# Patient Record
Sex: FEMALE | Race: WHITE | NOT HISPANIC OR LATINO | Employment: FULL TIME | URBAN - METROPOLITAN AREA
[De-identification: names, ages, dates, MRNs, and addresses within clinical notes are randomized per-mention and may not be internally consistent; named-entity substitution may affect disease eponyms.]

---

## 2017-02-22 ENCOUNTER — TELEPHONE (OUTPATIENT)
Dept: FAMILY MEDICINE | Facility: CLINIC | Age: 37
End: 2017-02-22

## 2017-02-22 NOTE — TELEPHONE ENCOUNTER
Panel Management Review      Patient has the following on her problem list: None      Composite cancer screening  Chart review shows that this patient is due/due soon for the following Pap Smear  Summary:    Patient is due/failing the following:   PAP    Action needed:   Patient needs office visit for pappe.    Type of outreach:    no answer, no voice mail set up, please retry.    Questions for provider review:    None                                                                                                                                    Kati Cabello MA       Chart routed to Care Team .

## 2017-02-22 NOTE — LETTER
[facility]  [address]  [facility] [address]  Phone: [phone]    March 21, 2017      Gunnar Erwin  [address] [place] [address]      Dear Gunnar:    As part of [facility]'s commitment to health and wellness, we inform our patient when records indicate the need for specific health screening.  Please review the following health screening recommendations based on your age:                 Ages 20-40:  Annual physical that includes a breast exam, pelvic exam and possibly a pap smear and other lab work.    Please bring a list of your current medications with you to your appointment.  If you will need refills prior to your appointment, please have your pharmacy fax a refill request to the appropriate provider; this helps to ensure that the correct medication and dose are ordered.    To schedule an appointment with a [facility] physician or nurse practitioner, please call:    [facility]            [phone]    [facility]           [phone]    [facility] Diagnostic Department           Family Practice Clinic . . . . . . [phone]                  (To Schedule a Mammogram)           Internal Medicine Clinic. . . . .  [phone]             OB/Gyn Clinic . . . . . . . . . . .  [phone]           Specialty Clinic . . . . . . . . . .  [phone]    NOTE:  Please disregard this notice if you have already scheduled your health maintenance exam(s) or if you have been given  different instructions from your health care provider.

## 2017-04-10 ENCOUNTER — TELEPHONE (OUTPATIENT)
Dept: FAMILY MEDICINE | Facility: CLINIC | Age: 37
End: 2017-04-10

## 2017-04-10 NOTE — TELEPHONE ENCOUNTER
She needs office visit for further refills.  She's also due for routine health maintenance such as pap.  FELIBERTO Brown

## 2017-04-10 NOTE — TELEPHONE ENCOUNTER
Called pt, appt for 4/13/17 was made with provider in clinic.   Pt verbalized understanding and had no further questions at this time.   Encounter closed.   Ashley LAM RN

## 2017-04-10 NOTE — TELEPHONE ENCOUNTER
Epitol      Last Written Prescription Date:  Not on med list  Last Fill Quantity: 0,   # refills: 0  Last Office Visit with [facility] or [facility] prescribing provider: 07/19/2016  Future Office visit:       Routing refill request to provider for review/approval because:  Drug not active on patient's medication list    Oscar REVELES (R)

## 2017-04-13 ENCOUNTER — OFFICE VISIT (OUTPATIENT)
Dept: FAMILY MEDICINE | Facility: CLINIC | Age: 37
End: 2017-04-13

## 2017-04-13 ENCOUNTER — TELEPHONE (OUTPATIENT)
Dept: FAMILY MEDICINE | Facility: CLINIC | Age: 37
End: 2017-04-13

## 2017-04-13 VITALS
HEART RATE: 79 BPM | BODY MASS INDEX: 38.26 KG/M2 | WEIGHT: 265 LBS | TEMPERATURE: 98.1 F | SYSTOLIC BLOOD PRESSURE: 130 MMHG | OXYGEN SATURATION: 100 % | DIASTOLIC BLOOD PRESSURE: 85 MMHG

## 2017-04-13 DIAGNOSIS — Z11.51 SCREENING FOR HUMAN PAPILLOMAVIRUS: ICD-10-CM

## 2017-04-13 DIAGNOSIS — M67.40 GANGLION CYST: ICD-10-CM

## 2017-04-13 DIAGNOSIS — Z00.00 ROUTINE GENERAL MEDICAL EXAMINATION AT A HEALTH CARE FACILITY: Primary | ICD-10-CM

## 2017-04-13 DIAGNOSIS — Z12.4 SCREENING FOR MALIGNANT NEOPLASM OF CERVIX: ICD-10-CM

## 2017-04-13 DIAGNOSIS — N89.8 VAGINAL DISCHARGE: ICD-10-CM

## 2017-04-13 DIAGNOSIS — R56.9 CONVULSIONS, UNSPECIFIED CONVULSION TYPE (H): ICD-10-CM

## 2017-04-13 LAB
MICRO REPORT STATUS: ABNORMAL
SPECIMEN SOURCE: ABNORMAL
WET PREP SPEC: ABNORMAL

## 2017-04-13 PROCEDURE — 87210 SMEAR WET MOUNT SALINE/INK: CPT | Performed by: FAMILY MEDICINE

## 2017-04-13 PROCEDURE — 99395 PREV VISIT EST AGE 18-39: CPT | Performed by: FAMILY MEDICINE

## 2017-04-13 PROCEDURE — G0124 SCREEN C/V THIN LAYER BY MD: HCPCS | Performed by: FAMILY MEDICINE

## 2017-04-13 PROCEDURE — 87624 HPV HI-RISK TYP POOLED RSLT: CPT | Performed by: FAMILY MEDICINE

## 2017-04-13 PROCEDURE — G0145 SCR C/V CYTO,THINLAYER,RESCR: HCPCS | Performed by: FAMILY MEDICINE

## 2017-04-13 PROCEDURE — 36415 COLL VENOUS BLD VENIPUNCTURE: CPT | Performed by: FAMILY MEDICINE

## 2017-04-13 PROCEDURE — 80156 ASSAY CARBAMAZEPINE TOTAL: CPT | Performed by: FAMILY MEDICINE

## 2017-04-13 RX ORDER — CARBAMAZEPINE 200 MG/1
600 TABLET ORAL 2 TIMES DAILY
Qty: 540 TABLET | Refills: 3 | Status: SHIPPED | OUTPATIENT
Start: 2017-04-13 | End: 2017-04-21

## 2017-04-13 ASSESSMENT — ANXIETY QUESTIONNAIRES
3. WORRYING TOO MUCH ABOUT DIFFERENT THINGS: MORE THAN HALF THE DAYS
7. FEELING AFRAID AS IF SOMETHING AWFUL MIGHT HAPPEN: NOT AT ALL
GAD7 TOTAL SCORE: 9
5. BEING SO RESTLESS THAT IT IS HARD TO SIT STILL: SEVERAL DAYS
6. BECOMING EASILY ANNOYED OR IRRITABLE: NOT AT ALL
IF YOU CHECKED OFF ANY PROBLEMS ON THIS QUESTIONNAIRE, HOW DIFFICULT HAVE THESE PROBLEMS MADE IT FOR YOU TO DO YOUR WORK, TAKE CARE OF THINGS AT HOME, OR GET ALONG WITH OTHER PEOPLE: SOMEWHAT DIFFICULT
1. FEELING NERVOUS, ANXIOUS, OR ON EDGE: NEARLY EVERY DAY
2. NOT BEING ABLE TO STOP OR CONTROL WORRYING: MORE THAN HALF THE DAYS

## 2017-04-13 ASSESSMENT — PATIENT HEALTH QUESTIONNAIRE - PHQ9: 5. POOR APPETITE OR OVEREATING: SEVERAL DAYS

## 2017-04-13 ASSESSMENT — PAIN SCALES - GENERAL: PAINLEVEL: MILD PAIN (3)

## 2017-04-13 NOTE — MR AVS SNAPSHOT
After Visit Summary   4/13/2017    Gunnar Erwin    MRN: [id number]           Patient Information     Date Of Birth          1980        Visit Information        Provider Department      4/13/2017 2:20 PM Shaquille Benito MD [facility]        Today's Diagnoses     Routine general medical examination at a health care facility    -  1    Screening for malignant neoplasm of cervix        Screening for human papillomavirus        Vaginal discharge        Convulsions, unspecified convulsion type (H)        Ganglion cyst          Care Instructions      Preventive Health Recommendations  Female Ages 26 - 39  Yearly exam:   See your health care provider every year in order to    Review health changes.     Discuss preventive care.      Review your medicines if you your doctor has prescribed any.    Until age 30: Get a Pap test every three years (more often if you have had an abnormal result).    After age 30: Talk to your doctor about whether you should have a Pap test every 3 years or have a Pap test with HPV screening every 5 years.   You do not need a Pap test if your uterus was removed (hysterectomy) and you have not had cancer.  You should be tested each year for STDs (sexually transmitted diseases), if you're at risk.   Talk to your provider about how often to have your cholesterol checked.  If you are at risk for diabetes, you should have a diabetes test (fasting glucose).  Shots: Get a flu shot each year. Get a tetanus shot every 10 years.   Nutrition:     Eat at least 5 servings of fruits and vegetables each day.    Eat whole-grain bread, whole-wheat pasta and brown rice instead of white grains and rice.    Talk to your provider about Calcium and Vitamin D.     Lifestyle    Exercise at least 150 minutes a week (30 minutes a day, 5 days of the week). This will help you control your weight and prevent disease.    Limit alcohol to one drink per day.    No smoking.      Wear sunscreen to prevent skin cancer.    See your dentist every six months for an exam and cleaning.          Follow-ups after your visit        Additional Services     ORTHO  REFERRAL       [facility] is referring you to the Orthopedic  Services at [facility].       The  Representative will assist you in the coordination of your Orthopedic and Musculoskeletal Care as prescribed by your physician.    The  Representative will call you within 1 business day to help schedule your appointment, or you may contact the  Representative at:    All areas ~ [phone]     Type of Referral : Non Surgical       Timeframe requested: Routine    Coverage of these services is subject to the terms and limitations of your health insurance plan.  Please call member services at your health plan with any benefit or coverage questions.      If X-rays, CT or MRI's have been performed, please contact the facility where they were done to arrange for , prior to your scheduled appointment.  Please bring this referral request to your appointment and present it to your specialist.                  Who to contact     If you have questions or need follow up information about today's clinic visit or your schedule please contact [facility] directly at [phone].  Normal or non-critical lab and imaging results will be communicated to you by MyChart, letter or phone within 4 business days after the clinic has received the results. If you do not hear from us within 7 days, please contact the clinic through MyChart or phone. If you have a critical or abnormal lab result, we will notify you by phone as soon as possible.  Submit refill requests through [employer] or call your pharmacy and they will forward the refill request to us. Please allow 3 business days for your refill to be completed.          Additional Information About Your  "Visit        [facility] Information     [facility] lets you send messages to your doctor, view your test results, renew your prescriptions, schedule appointments and more. To sign up, go to [email or web address] . Click on \"Log in\" on the left side of the screen, which will take you to the Welcome page. Then click on \"Sign up Now\" on the right side of the page.     You will be asked to enter the access code listed below, as well as some personal information. Please follow the directions to create your username and password.     Your access code is: [email or web address]  Expires: 2017  3:54 PM     Your access code will  in 90 days. If you need help or a new code, please call your [place] clinic or [phone].        Care EveryWhere ID     This is your Care EveryWhere ID. This could be used by other organizations to access your [place] medical records  [id number]        Your Vitals Were     Pulse Temperature Last Period Pulse Oximetry Breastfeeding? BMI (Body Mass Index)    79 98.1  F (36.7  C) (Tympanic) 2017 (Exact Date) 100% No 38.26 kg/m2       Blood Pressure from Last 3 Encounters:   17 130/85   16 115/77   16 122/83    Weight from Last 3 Encounters:   17 265 lb (120.2 kg)   16 250 lb (113.4 kg)   05/13/15 (!) 330 lb (149.7 kg)              We Performed the Following     Carbamazepine total     HPV High Risk Types DNA Cervical     ORTHO  REFERRAL     Pap imaged thin layer screen with HPV - recommended age 30 - 65     Wet prep          Today's Medication Changes          These changes are accurate as of: 17  3:55 PM.  If you have any questions, ask your nurse or doctor.               These medicines have changed or have updated prescriptions.        Dose/Directions    carBAMazepine 200 MG tablet   Commonly known as:  TEGRETOL   This may have changed:  additional instructions   Used for:  Convulsions, unspecified convulsion type (H)   Changed by:  Thong, " Shaquille Velasquez MD        Dose:  600 mg   Take 3 tablets (600 mg) by mouth 2 times daily   Quantity:  540 tablet   Refills:  3            Where to get your medicines      These medications were sent to [id number] - [place], [place] - [address]  [address] [address]     Phone:  [phone]     carBAMazepine 200 MG tablet                Primary Care Provider Office Phone # Fax #    Chiquita Madsen [phone]       [facility] [address]  [facility] [address]        Thank you!     Thank you for choosing [facility]  for your care. Our goal is always to provide you with excellent care. Hearing back from our patients is one way we can continue to improve our services. Please take a few minutes to complete the written survey that you may receive in the mail after your visit with us. Thank you!             Your Updated Medication List - Protect others around you: Learn how to safely use, store and throw away your medicines at www.disposemymeds.org.          This list is accurate as of: 4/13/17  3:55 PM.  Always use your most recent med list.                   Brand Name Dispense Instructions for use    carBAMazepine 200 MG tablet    TEGRETOL    540 tablet    Take 3 tablets (600 mg) by mouth 2 times daily

## 2017-04-13 NOTE — LETTER
April 20, 2017    Gunnar Langley Yaima  [address] [facility] [address]      Dear ,      This letter is in regards to your recent cervical cancer screening (Pap smear and HPV test).    Your Pap smear result was reported as ASCUS or Atypical Squamous Cells of Undetermined Significance.. This means that there were mildly abnormal cells found in the sample that we collected from your cervix, but no cancer cells were found. The vast majority of patients with this result do not have significant cervical abnormalities.     Your cervical sample was also tested for the presence of Human Papillomavirus (HPV). Your HPV test is NEGATIVE for high risk HPV, meaning that no HPV was found at this time.     Over time, your body can get rid of these abnormal cells, so it is recommended that you repeat your pap and HPV in 3 years.    If you have questions about these results contact [phone]    Please continue to be seen every year for an annual physical exam and other preventative tests.         Sincerely,    Shaquille Benito MD/andreea

## 2017-04-13 NOTE — TELEPHONE ENCOUNTER
Epitol      Last Written Prescription Date:  Not on med list  Last Fill Quantity: 0,   # refills: 0  Last Office Visit with [facility], [facility] or [facility] prescribing provider: 07/19/2016  Future Office visit:    Next 5 appointments (look out 90 days)     Apr 13, 2017  2:20 PM CDT   SHORT with Shaquille Benito MD   [facility] ([facility]    [address] [facility] [address]   [phone]                   Routing refill request to provider for review/approval because:  Drug not active on patient's medication list    Oscar Kumar RT (R)

## 2017-04-13 NOTE — NURSING NOTE
"Chief Complaint   Patient presents with     Gyn Exam     Recheck Medication       Initial /85 (BP Location: Right arm, Patient Position: Chair, Cuff Size: Adult Large)  Pulse 79  Temp 98.1  F (36.7  C) (Tympanic)  Wt 265 lb (120.2 kg)  LMP 04/07/2017 (Exact Date)  SpO2 100%  Breastfeeding? No  BMI 38.26 kg/m2 Estimated body mass index is 38.26 kg/(m^2) as calculated from the following:    Height as of 7/19/16: 5' 9.78\" (1.772 m).    Weight as of this encounter: 265 lb (120.2 kg).  Medication Reconciliation: complete   Radha Dozier CMA       "

## 2017-04-13 NOTE — PROGRESS NOTES
SUBJECTIVE:     CC: Gunnar Erwin is an 36 year old woman who presents for preventive health visit.     Healthy Habits:    Do you get at least three servings of calcium containing foods daily (dairy, green leafy vegetables, etc.)? yes    Amount of exercise or daily activities, outside of work: 3 day(s) per week    Problems taking medications regularly No    Medication side effects: No    Have you had an eye exam in the past two years? yes    Do you see a dentist twice per year? no    Do you have sleep apnea, excessive snoring or daytime drowsiness?no        Has a history of menorrhagia. Usually has 1-2 days of very heavy bleeding.  Has noticed occasional vaginal odor and discharge.    Today's PHQ-2 Score:   PHQ-2 ( 1999 [employer]) 7/19/2016 3/21/2014   Q1: Little interest or pleasure in doing things 0 0   Q2: Feeling down, depressed or hopeless 0 0   PHQ-2 Score 0 0     Social History   Substance Use Topics     Smoking status: Current Every Day Smoker     Packs/day: 0.10     Years: 4.00     Types: Cigarettes     Start date: 4/22/2015     Smokeless tobacco: Never Used     Alcohol use Yes     The patient does not drink >3 drinks per day nor >7 drinks per week.    Recent Labs   Lab Test  08/31/12   0949  05/20/11   0947   CHOL  219*  192   HDL  45*  36*   LDL  148*  129   TRIG  131  137   CHOLHDLRATIO  5.0  5.0       Reviewed orders with patient.  Reviewed health maintenance and updated orders accordingly - Yes    Mammo Decision Support:  Mammogram not appropriate for this patient based on age.    Pertinent mammograms are reviewed under the imaging tab.  History of abnormal Pap smear:   NO - age 30-65 PAP every 5 years with negative HPV co-testing recommended  Last 3 Pap Results:   PAP (no units)   Date Value   04/13/2017 ASC-US (A)   06/22/2011 NIL   10/09/2008 NIL       Reviewed and updated as needed this visit by clinical staff  Tobacco  Allergies  Med Hx  Surg Hx  Fam Hx  Soc Hx        Reviewed and  updated as needed this visit by Provider        Past Medical History:   Diagnosis Date     ASCUS of cervix with negative high risk HPV 2017     Urinary tract infection, site not specified       Past Surgical History:   Procedure Laterality Date     C/SECTION, CLASSICAL  2006    , Classical     C/SECTION, CLASSICAL      , Classical     TONSILLECTOMY & ADENOIDECTOMY  1991     TUBAL LIGATION  2006       ROS:  Constitutional, neuro, ENT, endocrine, pulmonary, cardiac, gastrointestinal, genitourinary, musculoskeletal, integument and psychiatric systems are negative, except as otherwise noted.     Problem list, Medication list, Allergies, and Medical/Social/Surgical histories reviewed in [facility] and updated as appropriate.  Labs reviewed in EPIC  Patient Active Problem List   Diagnosis     Convulsions (H)     Generalized anxiety disorder     Obesity     Transient hypertension of pregnancy, antepartum     CARDIOVASCULAR SCREENING; LDL GOAL LESS THAN 160     Shoulder instability     Tobacco abuse     Self-injurious behavior     Health Care Home     ASCUS of cervix with negative high risk HPV     Past Surgical History:   Procedure Laterality Date     C/SECTION, CLASSICAL  2006    , Classical     C/SECTION, CLASSICAL      , Classical     TONSILLECTOMY & ADENOIDECTOMY  1991     TUBAL LIGATION  2006       Social History   Substance Use Topics     Smoking status: Current Every Day Smoker     Packs/day: 0.10     Years: 4.00     Types: Cigarettes     Start date: 2015     Smokeless tobacco: Never Used     Alcohol use Yes     Family History   Problem Relation Age of Onset     Hypertension Father      Depression Father      Alcohol/Drug Father      Hypertension Paternal Grandmother      Depression Paternal Grandmother      Alcohol/Drug Paternal Grandmother      Psychotic Disorder Paternal Grandmother      Hypertension Paternal  Grandfather      Depression Paternal Grandfather      CANCER Paternal Grandfather      cancer around his bile duct.     Alcohol/Drug Mother      Depression Mother      Alcohol/Drug Brother      Depression Brother      Psychotic Disorder Brother      Asthma No family hx of      C.A.D. No family hx of      DIABETES No family hx of      CEREBROVASCULAR DISEASE No family hx of      Breast Cancer No family hx of      Cancer - colorectal No family hx of      Prostate Cancer No family hx of          Current Outpatient Prescriptions   Medication Sig Dispense Refill     TEGRETOL 200 MG tablet Take 3 tablets (600 mg) by mouth 2 times daily 540 tablet 3     Allergies   Allergen Reactions     Nkda [No Known Drug Allergies]      OBJECTIVE:     There were no vitals taken for this visit.  EXAM:  GENERAL: healthy, alert and no distress  EYES: Eyes grossly normal to inspection, PERRL and conjunctivae and sclerae normal  HENT: ear canals and TM's normal, nose and mouth without ulcers or lesions  NECK: no adenopathy, no asymmetry, masses, or scars and thyroid normal to palpation  RESP: lungs clear to auscultation - no rales, rhonchi or wheezes  BREAST: normal without masses, tenderness or nipple discharge and no palpable axillary masses or adenopathy  CV: regular rate and rhythm, normal S1 S2, no S3 or S4, no murmur, click or rub, no peripheral edema and peripheral pulses strong  ABDOMEN: soft, nontender, no hepatosplenomegaly, no masses and bowel sounds normal   (female): normal female external genitalia, normal urethral meatus, vaginal mucosa pink, moist, well rugated, and normal cervix/adnexa/uterus without masses or discharge  MS: no gross musculoskeletal defects noted, no edema  SKIN: no suspicious lesions or rashes  NEURO: Normal strength and tone, mentation intact and speech normal  PSYCH: mentation appears normal, affect normal/bright    ASSESSMENT/PLAN:     1. Routine general medical examination at a [facility]  "facility    2. Convulsions, unspecified convulsion type (H)  Stable. Refilled medication.  Check labs.   - Carbamazepine total    3. Vaginal discharge  - Wet prep  - metroNIDAZOLE (METROGEL) 0.75 % vaginal gel; Place 1 applicator (5 g) vaginally At Bedtime for 5 days  Dispense: 70 g; Refill: 0    4. Ganglion cyst  - ORTHO  REFERRAL    5. Screening for malignant neoplasm of cervix  - Pap imaged thin layer screen with HPV - recommended age 30 - 65    6. Screening for human papillomavirus  - HPV High Risk Types DNA Cervical    COUNSELING:   Reviewed preventive health counseling, as reflected in patient instructions    BP Screening:   Last 3 BP Readings:    BP Readings from Last 3 Encounters:   04/13/17 130/85   07/19/16 115/77   07/02/16 122/83       The following was recommended to the patient:  Re-screen BP within a year and recommended lifestyle modifications     reports that she has been smoking Cigarettes.  She started smoking about 1 years ago. She has a 0.40 pack-year smoking history. She has never used smokeless tobacco.  Tobacco Cessation Action Plan: Information offered: Patient not interested at this time  Estimated body mass index is 36.09 kg/(m^2) as calculated from the following:    Height as of 7/19/16: 5' 9.78\" (1.772 m).    Weight as of 7/19/16: 250 lb (113.4 kg).   Weight management plan: Discussed healthy diet and exercise guidelines and patient will follow up in 12 months in clinic to re-evaluate.    Counseling Resources:  ATP IV Guidelines  Pooled Cohorts Equation Calculator  Breast Cancer Risk Calculator  FRAX Risk Assessment  ICSI Preventive Guidelines  Dietary Guidelines for Americans, 2010  USDA's MyPlate  ASA Prophylaxis  Lung CA Screening    Shaquille Benito MD  [facility]  "

## 2017-04-13 NOTE — TELEPHONE ENCOUNTER
This was already denied by [facility] Zarago[facility].  See refill request from 4/10/2017 - we may need to notify pharmacy so they quit sending the refill request.      Has apt with Dr. MILADYS Benito this afternoon.    Chiquita Madsen M.D.

## 2017-04-14 LAB — CARBAMAZEPINE SERPL-MCNC: 2.3 MG/L (ref 4–12)

## 2017-04-14 RX ORDER — METRONIDAZOLE 7.5 MG/G
1 GEL VAGINAL AT BEDTIME
Qty: 70 G | Refills: 0 | Status: SHIPPED | OUTPATIENT
Start: 2017-04-14 | End: 2017-04-19

## 2017-04-14 ASSESSMENT — ANXIETY QUESTIONNAIRES: GAD7 TOTAL SCORE: 9

## 2017-04-14 ASSESSMENT — PATIENT HEALTH QUESTIONNAIRE - PHQ9: SUM OF ALL RESPONSES TO PHQ QUESTIONS 1-9: 3

## 2017-04-18 LAB
COPATH REPORT: ABNORMAL
PAP: ABNORMAL

## 2017-04-20 LAB
FINAL DIAGNOSIS: NORMAL
HPV HR 12 DNA CVX QL NAA+PROBE: NEGATIVE
HPV16 DNA SPEC QL NAA+PROBE: NEGATIVE
HPV18 DNA SPEC QL NAA+PROBE: NEGATIVE
SPECIMEN DESCRIPTION: NORMAL

## 2017-04-21 DIAGNOSIS — R56.9 CONVULSIONS, UNSPECIFIED CONVULSION TYPE (H): ICD-10-CM

## 2017-04-21 RX ORDER — CARBAMAZEPINE 200 MG
600 TABLET ORAL 2 TIMES DAILY
Qty: 540 TABLET | Refills: 3 | Status: SHIPPED | OUTPATIENT
Start: 2017-04-21 | End: 2018-04-26

## 2017-04-21 RX ORDER — CARBAMAZEPINE 200 MG/1
600 TABLET ORAL 2 TIMES DAILY
Qty: 540 TABLET | Refills: 3 | Status: CANCELLED | OUTPATIENT
Start: 2017-04-21

## 2018-04-26 DIAGNOSIS — R56.9 CONVULSIONS, UNSPECIFIED CONVULSION TYPE (H): ICD-10-CM

## 2018-04-27 NOTE — TELEPHONE ENCOUNTER
"Requested Prescriptions   Pending Prescriptions Disp Refills     TEGRETOL 200 MG tablet [Pharmacy Med Name: EPITOL 200MG TAB]  Last Written Prescription Date:  04/21/2017  Last Fill Quantity: 540,  # refills: 3   Last office visit: 4/13/2017 with prescribing provider:  Cale   Future Office Visit:     84 tablet 25     Sig: TAKE THREE TABLETS BY MOUTH TWICE DAILY    Anti-Seizure Meds Protocol  Failed    4/26/2018  6:34 PM       Failed - Recent (12 mo) or future (30 days) visit within the authorizing provider's specialty    Patient had office visit in the last 12 months or has a visit in the next 30 days with authorizing provider or within the authorizing provider's specialty.  See \"Patient Info\" tab in inbasket, or \"Choose Columns\" in Meds & Orders section of the refill encounter.           Failed - Review Authorizing provider's last note.     Refer to last progress notes: confirm request is for original authorizing provider (cannot be through other providers).         Failed - Normal CBC on file in past 26 months    Recent Labs   Lab Test  05/13/15   1710   WBC  7.0   RBC  4.14   HGB  13.6   HCT  40.0   PLT  271            Failed - Normal ALT or AST on file in past 26 months    Recent Labs   Lab Test  05/13/15   1710   ALT  28     Recent Labs   Lab Test  05/13/15   1710   AST  20            Failed - Normal platelet count on file in past 26 months    Recent Labs   Lab Test  05/13/15   1710   PLT  271              Passed - Carbamazepine level within therapeutic range in last 26 months    No lab results found.    Carbamazepine level must be checked 2-4 weeks after dosage change.           Passed - No active pregnancy on record       Passed - No positive pregnancy test in last 12 months        Oscar Kumar RT (R)    "

## 2018-05-01 RX ORDER — CARBAMAZEPINE 200 MG
TABLET ORAL
Qty: 6 TABLET | Refills: 0 | Status: SHIPPED | OUTPATIENT
Start: 2018-05-01 | End: 2018-05-02

## 2018-05-01 NOTE — TELEPHONE ENCOUNTER
Patient notified she is due for for OV with her provider  Patient verbalized understanding and reports she is out of medication  Scheduled appt 5/2/18  Patient asked for clinic to call pharmacy to get one day of medication to pharmacy today to get her through until she sees provider tomorrow  Sent 6 tablets to pharmacy today    Leida KU Rn

## 2018-05-02 ENCOUNTER — OFFICE VISIT (OUTPATIENT)
Dept: FAMILY MEDICINE | Facility: CLINIC | Age: 38
End: 2018-05-02

## 2018-05-02 VITALS
HEIGHT: 69 IN | RESPIRATION RATE: 16 BRPM | BODY MASS INDEX: 43.1 KG/M2 | DIASTOLIC BLOOD PRESSURE: 87 MMHG | HEART RATE: 78 BPM | TEMPERATURE: 98.8 F | WEIGHT: 291 LBS | SYSTOLIC BLOOD PRESSURE: 129 MMHG

## 2018-05-02 DIAGNOSIS — Z00.00 ROUTINE GENERAL MEDICAL EXAMINATION AT A HEALTH CARE FACILITY: Primary | ICD-10-CM

## 2018-05-02 DIAGNOSIS — Z23 NEED FOR VACCINATION: ICD-10-CM

## 2018-05-02 DIAGNOSIS — R56.9 CONVULSIONS, UNSPECIFIED CONVULSION TYPE (H): ICD-10-CM

## 2018-05-02 DIAGNOSIS — Z13.220 SCREENING FOR LIPOID DISORDERS: ICD-10-CM

## 2018-05-02 LAB
CARBAMAZEPINE SERPL-MCNC: 9.6 MG/L (ref 4–12)
CHOLEST SERPL-MCNC: 214 MG/DL
HDLC SERPL-MCNC: 52 MG/DL
LDLC SERPL CALC-MCNC: 140 MG/DL
NONHDLC SERPL-MCNC: 162 MG/DL
TRIGL SERPL-MCNC: 110 MG/DL

## 2018-05-02 PROCEDURE — 90714 TD VACC NO PRESV 7 YRS+ IM: CPT | Performed by: NURSE PRACTITIONER

## 2018-05-02 PROCEDURE — 80156 ASSAY CARBAMAZEPINE TOTAL: CPT | Performed by: NURSE PRACTITIONER

## 2018-05-02 PROCEDURE — 80061 LIPID PANEL: CPT | Performed by: NURSE PRACTITIONER

## 2018-05-02 PROCEDURE — 99395 PREV VISIT EST AGE 18-39: CPT | Mod: 25 | Performed by: NURSE PRACTITIONER

## 2018-05-02 PROCEDURE — 90471 IMMUNIZATION ADMIN: CPT | Performed by: NURSE PRACTITIONER

## 2018-05-02 PROCEDURE — 36415 COLL VENOUS BLD VENIPUNCTURE: CPT | Performed by: NURSE PRACTITIONER

## 2018-05-02 RX ORDER — CARBAMAZEPINE 200 MG/1
TABLET ORAL
Qty: 84 TABLET | Refills: 11 | Status: SHIPPED | OUTPATIENT
Start: 2018-05-02 | End: 2018-10-15

## 2018-05-02 NOTE — NURSING NOTE
"Chief Complaint   Patient presents with     Physical       Initial /87 (BP Location: Right arm, Patient Position: Chair, Cuff Size: Adult Large)  Pulse 78  Temp 98.8  F (37.1  C) (Oral)  Resp 16  Ht 5' 9\" (1.753 m)  Wt 291 lb (132 kg)  LMP 04/20/2018 (Approximate)  BMI 42.97 kg/m2 Estimated body mass index is 42.97 kg/(m^2) as calculated from the following:    Height as of this encounter: 5' 9\" (1.753 m).    Weight as of this encounter: 291 lb (132 kg).  Medication Reconciliation: complete     Prior to injection verified patient identity using patient's name and date of birth.    Screening Questionnaire for Adult Immunization    Are you sick today?   No   Do you have allergies to medications, food, a vaccine component or latex?   No   Have you ever had a serious reaction after receiving a vaccination?   No   Do you have a long-term health problem with heart disease, lung disease, asthma, kidney disease, metabolic disease (e.g. diabetes), anemia, or other blood disorder?   No   Do you have cancer, leukemia, HIV/AIDS, or any other immune system problem?   No   In the past 3 months, have you taken medications that affect  your immune system, such as prednisone, other steroids, or anticancer drugs; drugs for the treatment of rheumatoid arthritis, Crohn s disease, or psoriasis; or have you had radiation treatments?   No   Have you had a seizure, or a brain or other nervous system problem?   No   During the past year, have you received a transfusion of blood or blood     products, or been given immune (gamma) globulin or antiviral drug?   No   For women: Are you pregnant or is there a chance you could become        pregnant during the next month?   No   Have you received any vaccinations in the past 4 weeks?   No     Immunization questionnaire answers were all negative.        Per orders of Naomy Zaragoza, injection of Td given by Kati Cabello. Patient instructed to remain in clinic for 15 minutes " afterwards, and to report any adverse reaction to me immediately.       Screening performed by Kati Cabello on 5/2/2018 at 8:58 AM.
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
## 2018-05-02 NOTE — MR AVS SNAPSHOT
After Visit Summary   5/2/2018    Gunnar Erwin    MRN: [id number]           Patient Information     Date Of Birth          1980        Visit Information        Provider Department      5/2/2018 8:00 AM Naomy Zaragoza APRN [facility]        Today's Diagnoses     Routine general medical examination at a health care facility    -  1    Convulsions, unspecified convulsion type (H)        Screening for lipoid disorders          Care Instructions      Preventive Health Recommendations  Female Ages 26 - 39  Yearly exam:   See your health care provider every year in order to    Review health changes.     Discuss preventive care.      Review your medicines if you your doctor has prescribed any.    Until age 30: Get a Pap test every three years (more often if you have had an abnormal result).    After age 30: Talk to your doctor about whether you should have a Pap test every 3 years or have a Pap test with HPV screening every 5 years.   You do not need a Pap test if your uterus was removed (hysterectomy) and you have not had cancer.  You should be tested each year for STDs (sexually transmitted diseases), if you're at risk.   Talk to your provider about how often to have your cholesterol checked.  If you are at risk for diabetes, you should have a diabetes test (fasting glucose).  Shots: Get a flu shot each year. Get a tetanus shot every 10 years.   Nutrition:     Eat at least 5 servings of fruits and vegetables each day.    Eat whole-grain bread, whole-wheat pasta and brown rice instead of white grains and rice.    Talk to your provider about Calcium and Vitamin D.     Lifestyle    Exercise at least 150 minutes a week (30 minutes a day, 5 days of the week). This will help you control your weight and prevent disease.    Limit alcohol to one drink per day.    No smoking.     Wear sunscreen to prevent skin cancer.    See your dentist every six months for an exam and  "cleaning.            Follow-ups after your visit        Who to contact     If you have questions or need follow up information about today's clinic visit or your schedule please contact [facility] directly at [phone].  Normal or non-critical lab and imaging results will be communicated to you by MyChart, letter or phone within 4 business days after the clinic has received the results. If you do not hear from us within 7 days, please contact the clinic through MyChart or phone. If you have a critical or abnormal lab result, we will notify you by phone as soon as possible.  Submit refill requests through [employer] or call your pharmacy and they will forward the refill request to us. Please allow 3 business days for your refill to be completed.          Additional Information About Your Visit        [employer] Information     [employer] lets you send messages to your doctor, view your test results, renew your prescriptions, schedule appointments and more. To sign up, go to [email or web address] . Click on \"Log in\" on the left side of the screen, which will take you to the Welcome page. Then click on \"Sign up Now\" on the right side of the page.     You will be asked to enter the access code listed below, as well as some personal information. Please follow the directions to create your username and password.     Your access code is: [email or web address]  Expires: 2018  8:49 AM     Your access code will  in 90 days. If you need help or a new code, please call your [place] clinic or [phone].        Care EveryWhere ID     This is your Care EveryWhere ID. This could be used by other organizations to access your [place] medical records  [id number]        Your Vitals Were     Pulse Temperature Respirations Height Last Period BMI (Body Mass Index)    78 98.8  F (37.1  C) (Oral) 16 5' 9\" (1.753 m) 2018 (Approximate) 42.97 kg/m2       Blood Pressure from Last 3 Encounters:   18 129/87 "   04/13/17 130/85   07/19/16 115/77    Weight from Last 3 Encounters:   05/02/18 291 lb (132 kg)   04/13/17 265 lb (120.2 kg)   07/19/16 250 lb (113.4 kg)              We Performed the Following     Carbamazepine total     Lipid Profile (Chol, Trig, HDL, LDL calc)          Today's Medication Changes          These changes are accurate as of 5/2/18  8:49 AM.  If you have any questions, ask your nurse or doctor.               These medicines have changed or have updated prescriptions.        Dose/Directions    carBAMazepine 200 MG tablet   Commonly known as:  TEGRETOL   This may have changed:  See the new instructions.   Used for:  Convulsions, unspecified convulsion type (H)   Changed by:  Naomy Zaragoza APRN CNP        Take 3 tablets by mouth twice daily.   Quantity:  84 tablet   Refills:  11            Where to get your medicines      These medications were sent to [facility] [address] [place] [address] [address]  [address], [facility] [address]     Phone:  [phone]     carBAMazepine 200 MG tablet                Primary Care Provider Office Phone # Fax #    Chiquita Madsen [phone] [address] [facility] [address]        Equal Access to Services     BERNA CHUA AH: Hadii linda mcleod hadasho Soviridianaali, waaxda luqadaha, qaybta kaalmada adeegyada, aidee obando. So [facility] [phone].    ATENCIÓN: Si habla español, tiene a saenz disposición servicios gratuitos de asistencia lingüística. Llame al [phone].    We comply with applicable federal civil rights laws and [place] laws. We do not discriminate on the basis of race, color, national origin, age, disability, sex, sexual orientation, or gender identity.            Thank you!     Thank you for choosing [facility]  for your care. Our goal is always to provide you with excellent care. Hearing back from our patients is one way we can continue to improve  our services. Please take a few minutes to complete the written survey that you may receive in the mail after your visit with us. Thank you!             Your Updated Medication List - Protect others around you: Learn how to safely use, store and throw away your medicines at www.disposemymeds.org.          This list is accurate as of 5/2/18  8:49 AM.  Always use your most recent med list.                   Brand Name Dispense Instructions for use Diagnosis    carBAMazepine 200 MG tablet    TEGRETOL    84 tablet    Take 3 tablets by mouth twice daily.    Convulsions, unspecified convulsion type (H)

## 2018-06-28 ENCOUNTER — OFFICE VISIT (OUTPATIENT)
Dept: FAMILY MEDICINE | Facility: CLINIC | Age: 38
End: 2018-06-28

## 2018-06-28 ENCOUNTER — RADIANT APPOINTMENT (OUTPATIENT)
Dept: GENERAL RADIOLOGY | Facility: CLINIC | Age: 38
End: 2018-06-28
Attending: NURSE PRACTITIONER

## 2018-06-28 VITALS
HEIGHT: 69 IN | TEMPERATURE: 99.5 F | HEART RATE: 65 BPM | RESPIRATION RATE: 16 BRPM | WEIGHT: 290 LBS | DIASTOLIC BLOOD PRESSURE: 74 MMHG | BODY MASS INDEX: 42.95 KG/M2 | SYSTOLIC BLOOD PRESSURE: 116 MMHG

## 2018-06-28 DIAGNOSIS — M25.562 ACUTE PAIN OF LEFT KNEE: ICD-10-CM

## 2018-06-28 DIAGNOSIS — M25.562 ACUTE PAIN OF LEFT KNEE: Primary | ICD-10-CM

## 2018-06-28 DIAGNOSIS — E66.01 MORBID OBESITY (H): ICD-10-CM

## 2018-06-28 PROCEDURE — 99213 OFFICE O/P EST LOW 20 MIN: CPT | Performed by: NURSE PRACTITIONER

## 2018-06-28 PROCEDURE — 73562 X-RAY EXAM OF KNEE 3: CPT | Mod: LT

## 2018-06-28 ASSESSMENT — PAIN SCALES - GENERAL: PAINLEVEL: WORST PAIN (10)

## 2018-06-28 NOTE — MR AVS SNAPSHOT
After Visit Summary   6/28/2018    Gunnar Erwin    MRN: [id number]           Patient Information     Date Of Birth          1980        Visit Information        Provider Department      6/28/2018 7:40 AM Naomy Zaragoza APRN [facility]        Today's Diagnoses     Acute pain of left knee    -  1    Morbid obesity (H)          Care Instructions    Will be notified of pending x ray results.  Start PT.  Ice and Naproxen twice daily with food for the next 3 days.  Follow up if symptoms persist or worsen and as needed.        Thank you for choosing [facility].  You may be receiving a survey in the mail from [employer] regarding your visit today.  Please take a few minutes to complete and return the survey to let us know how we are doing.      Our Clinic hours are:  Mondays    7:20 am - 7 pm  Tues - Fri  7:20 am - 5 pm    Clinic Phone: [phone]    The clinic lab opens at 7:30 am Mon - Fri and appointments are required.    [facility]. [phone]  Monday  8 am - 7pm  Tues - Fri 8 am - 5:30 pm                 Follow-ups after your visit        Additional Services     PHYSICAL THERAPY REFERRAL       *This therapy referral will be filtered to a centralized scheduling office at [facility] and the patient will receive a call to schedule an appointment at a [facility] location most convenient for them. *     [facility] provides Physical Therapy evaluation and treatment and many specialty services across the [facility] system.  If requesting a specialty program, please choose from the list below.    If you have not heard from the scheduling office within 2 business days, please call [phone] for all locations, with the exception of [place], please call [phone] and [facility], please call [phone]  Treatment: Evaluation & Treatment  Special Instructions/Modalities:   Special Programs:  "    Please be aware that coverage of these services is subject to the terms and limitations of your health insurance plan.  Call member services at your health plan with any benefit or coverage questions.      **Note to Provider:  If you are referring outside of [place] for the therapy appointment, please list the name of the location in the \"special instructions\" above, print the referral and give to the patient to schedule the appointment.                  Follow-up notes from your care team     Return if symptoms worsen or fail to improve.      Future tests that were ordered for you today     Open Future Orders        Priority Expected Expires Ordered    XR Knee Left 3 Views Routine 6/28/2018 6/28/2019 6/28/2018            Who to contact     If you have questions or need follow up information about today's clinic visit or your schedule please contact [facility] directly at [phone].  Normal or non-critical lab and imaging results will be communicated to you by [employer], letter or phone within 4 business days after the clinic has received the results. If you do not hear from us within 7 days, please contact the clinic through [employer] or phone. If you have a critical or abnormal lab result, we will notify you by phone as soon as possible.  Submit refill requests through [employer] or call your pharmacy and they will forward the refill request to us. Please allow 3 business days for your refill to be completed.          Additional Information About Your Visit        [employer] Information     [employer] gives you secure access to your electronic health record. If you see a primary care provider, you can also send messages to your care team and make appointments. If you have questions, please call your primary care clinic.  If you do not have a primary care provider, please call [phone] and they will assist you.        Care EveryWhere ID     This is your Care EveryWhere ID. This could be used by " "other organizations to access your [place] medical records  [id number]        Your Vitals Were     Pulse Temperature Respirations Height BMI (Body Mass Index)       65 99.5  F (37.5  C) (Oral) 16 5' 9\" (1.753 m) 42.83 kg/m2        Blood Pressure from Last 3 Encounters:   06/28/18 116/74   05/02/18 129/87   04/13/17 130/85    Weight from Last 3 Encounters:   06/28/18 290 lb (131.5 kg)   05/02/18 291 lb (132 kg)   04/13/17 265 lb (120.2 kg)              We Performed the Following     PHYSICAL THERAPY REFERRAL        Primary Care Provider Office Phone # Fax #    Chiquita Madsen [phone] [address] [facility] [address]        Equal Access to Services     [facility] HospitalAL[facility] : Hadii aad ku hadasho Soelsie, waaxda luqadaha, qaybta kaalmada aderavindrayacaitlyn, aidee boyd . So [facility] [phone].    ATENCIÓN: Si habla español, tiene a saenz disposición servicios gratuitos de asistencia lingüística. iVdal al [phone].    We comply with applicable federal civil rights laws and [place] laws. We do not discriminate on the basis of race, color, national origin, age, disability, sex, sexual orientation, or gender identity.            Thank you!     Thank you for choosing [facility]  for your care. Our goal is always to provide you with excellent care. Hearing back from our patients is one way we can continue to improve our services. Please take a few minutes to complete the written survey that you may receive in the mail after your visit with us. Thank you!             Your Updated Medication List - Protect others around you: Learn how to safely use, store and throw away your medicines at www.disposemymeds.org.          This list is accurate as of 6/28/18  8:38 AM.  Always use your most recent med list.                   Brand Name Dispense Instructions for use Diagnosis    carBAMazepine 200 MG tablet    TEGRETOL    84 tablet    Take 3 tablets by mouth " twice daily.    Convulsions, unspecified convulsion type (H)

## 2018-06-28 NOTE — PROGRESS NOTES
"  SUBJECTIVE:   Gunnar Erwin is a 38 year old female who presents to clinic today for the following health issues:      Musculoskeletal problem/pain      Duration: years, 6 weeks ago she was kneeling sh e heard a pop and has been a 10/10 pain ever since.    Description  Location: left knee    Intensity:  severe    Accompanying signs and symptoms: radiation of pain to up and down the leg, weakness of knee, warmth and swelling    History  Previous similar problem: YES  Previous evaluation:  x-ray-years ago    Precipitating or alleviating factors:  Trauma or overuse: YES- was kneeling 6 weeks ago and heard a pop.  Aggravating factors include: sitting and laying    Therapies tried and outcome: ice, NSAID - not helping and KT tape, she has had to take a sleep aid to sleep at night.          Problem list and histories reviewed & adjusted, as indicated.  Additional history: states \"years ago\" she hurt her left knee. She doesn't recall much for specifics but does think they told her it was meniscus. She never had surgery and doesn't recall doing PT.  She states left knee has been hurting again for about six weeks. She was kneeling and felt a loud pop which started the pain. Pain is worse at night. She works as a cook and stands a lot. Denies any swelling or bruising of knee.  She has tried Ibuprofen, ice and KT tape which does help a little.  No other joint pain.   Feels well otherwise.  She does a lot of holistic treatments to help with inflammation like tumeric and states arthritis does run in her family.  Struggles with her weight, likes to hike but hasn't been able to with knee pain.    Patient Active Problem List   Diagnosis     Convulsions (H)     Generalized anxiety disorder     Obesity     Transient hypertension of pregnancy, antepartum     CARDIOVASCULAR SCREENING; LDL GOAL LESS THAN 160     Shoulder instability     Tobacco abuse     Self-injurious behavior     Health Care Home     ASCUS of cervix with " negative high risk HPV     Morbid obesity (H)     Past Surgical History:   Procedure Laterality Date     C/SECTION, CLASSICAL  2006    , Classical     C/SECTION, CLASSICAL      , Classical     TONSILLECTOMY & ADENOIDECTOMY  1991     TUBAL LIGATION  2006       Social History   Substance Use Topics     Smoking status: Former Smoker     Packs/day: 0.10     Years: 7.00     Types: Cigarettes     Start date: 2015     Smokeless tobacco: Never Used      Comment: 3-4 ciggs per day     Alcohol use Yes      Comment: occ     Family History   Problem Relation Age of Onset     Hypertension Father      Depression Father      Alcohol/Drug Father      Hypertension Paternal Grandmother      Depression Paternal Grandmother      Alcohol/Drug Paternal Grandmother      Psychotic Disorder Paternal Grandmother      Hypertension Paternal Grandfather      Depression Paternal Grandfather      Cancer Paternal Grandfather      cancer around his bile duct.     Alcohol/Drug Mother      Depression Mother      Alcohol/Drug Brother      Depression Brother      Psychotic Disorder Brother      Asthma No family hx of      C.A.D. No family hx of      Diabetes No family hx of      Cerebrovascular Disease No family hx of      Breast Cancer No family hx of      Cancer - colorectal No family hx of      Prostate Cancer No family hx of          Current Outpatient Prescriptions   Medication Sig Dispense Refill     carBAMazepine (TEGRETOL) 200 MG tablet Take 3 tablets by mouth twice daily. 84 tablet 11     Allergies   Allergen Reactions     Nkda [No Known Drug Allergies]      BP Readings from Last 3 Encounters:   18 116/74   18 129/87   17 130/85    Wt Readings from Last 3 Encounters:   18 290 lb (131.5 kg)   18 291 lb (132 kg)   17 265 lb (120.2 kg)                    Reviewed and updated as needed this visit by clinical staff  Tobacco  Allergies  Meds  Med Hx  Surg Hx   "Fam Hx  Soc Hx      Reviewed and updated as needed this visit by Provider          ROS: 10 point ROS neg other than the symptoms noted above in the HPI.    OBJECTIVE:     /74 (BP Location: Right arm, Patient Position: Chair, Cuff Size: Adult Large)  Pulse 65  Temp 99.5  F (37.5  C) (Oral)  Resp 16  Ht 5' 9\" (1.753 m)  Wt 290 lb (131.5 kg)  BMI 42.83 kg/m2  Body mass index is 42.83 kg/(m^2).  GENERAL: healthy, alert and no distress  NECK: no adenopathy, no asymmetry  RESP: lungs clear to auscultation - no rales, rhonchi or wheezes  CV: regular rate and rhythm, normal S1 S2, no S3 or S4, no murmur  ABDOMEN: soft, obese  MS: no gross musculoskeletal defects noted, able to walk without limp, good ability to do squat, FROM of knee without limitations, clicking or instability, pain is present with palpation over anterior knee and joint line      Diagnostic Test Results:  No results found for this or any previous visit (from the past 24 hour(s)).    ASSESSMENT/PLAN:             1. Acute pain of left knee    - XR Knee Left 3 Views; Future  - PHYSICAL THERAPY REFERRAL  Will start with x ray and PT. If needed, proceed with MRI and orthopedics consult. Reviewed need to lose weight.     2. Morbid obesity (H)        See Patient Instructions  Patient Instructions   Will be notified of pending x ray results.  Start PT.  Ice and Naproxen twice daily with food for the next 3 days.  Follow up if symptoms persist or worsen and as needed.        Thank you for choosing [facility].  You may be receiving a survey in the mail from [employer] regarding your visit today.  Please take a few minutes to complete and return the survey to let us know how we are doing.      Our Clinic hours are:  Mondays    7:20 am - 7 pm  Tues -  Fri  7:20 am - 5 pm    Clinic Phone: [phone]    The clinic lab opens at 7:30 am Mon - Fri and appointments are required.    [facility]. [phone]  Monday  8 am - " 7pm  Tues - Fri 8 am - 5:30 pm             DIONNA Nelson [facility]

## 2018-09-07 ENCOUNTER — TELEPHONE (OUTPATIENT)
Dept: FAMILY MEDICINE | Facility: CLINIC | Age: 38
End: 2018-09-07

## 2018-09-07 NOTE — TELEPHONE ENCOUNTER
Reason for call:  Patient reporting a symptom    Symptom or request: frequent urination, burning    Duration (how long have symptoms been present): 4 day    Have you been treated for this before? In the past    Additional comments: would like to know if she needs to be seen     Phone Number patient can be reached at:  Home number on file [phone] (home)    Best Time:  any    Can we leave a detailed message on this number:  YES    Call taken on 9/7/2018 at 8:09 AM by Radha David

## 2018-09-07 NOTE — TELEPHONE ENCOUNTER
Patient was advised to be seen. Discussed with patient all the options. Patient declined office visit for today.  Discussed with patient she may try an e- visit or urgent care.  Patient agrees with plan.    Tika ROSALES RN

## 2018-09-09 ENCOUNTER — TRANSFERRED RECORDS (OUTPATIENT)
Dept: HEALTH INFORMATION MANAGEMENT | Facility: CLINIC | Age: 38
End: 2018-09-09

## 2018-09-12 ENCOUNTER — TELEPHONE (OUTPATIENT)
Dept: FAMILY MEDICINE | Facility: CLINIC | Age: 38
End: 2018-09-12

## 2018-09-12 DIAGNOSIS — N39.0 URINARY TRACT INFECTION, ACUTE: Primary | ICD-10-CM

## 2018-09-12 RX ORDER — SULFAMETHOXAZOLE/TRIMETHOPRIM 800-160 MG
1 TABLET ORAL 2 TIMES DAILY
Qty: 6 TABLET | Refills: 0 | Status: SHIPPED | OUTPATIENT
Start: 2018-09-12 | End: 2018-09-15

## 2018-09-12 NOTE — TELEPHONE ENCOUNTER
Patient was notified she would need to be seen.  Advised patient to contact the ER and check to see if they ordered an culture and to check those results.  Advised patient if she is not getting better to be seen she may need IV antibiotics.  Patient agrees with plan and understands.    Tika ROSALES RN

## 2018-09-12 NOTE — TELEPHONE ENCOUNTER
Urine culture showed >100,000 colonies of Staph Saprophyticus.  Responds to nitrofurantoin, levaquin and Bactrim.    Not sure if there was a response to Keflex (what she was prescribed).    Prescription for Bactrim DS 1 tab twice daily x 3 days.     If symptoms persist after this, will need a visit.     In the future, this would be best done as an e-visit (rx needed/wanted).    Chiquita Madsen M.D.

## 2018-09-12 NOTE — TELEPHONE ENCOUNTER
Patient is calling and stating that [facility] will be faxing us results. Please call if fax is not here by 3 pm.  Sally Pantoja  [facility]

## 2018-09-12 NOTE — TELEPHONE ENCOUNTER
Patient called because she was still having UTI symptoms from ER visit.  Patient was advised to contact [facility] to check UC. We received copy of ER visit.  The culture showed the antibiotics she was given will not cover her UTI.  Patient reports they would not give her the results they would only sent them to the clinic.  Advised patient she may need to be seen despite the lab results.  Reports is placed on PCP.    Please review and advise.    Thank you    Tika ROSALES RN

## 2018-09-12 NOTE — TELEPHONE ENCOUNTER
Reason for call:  Patient reporting a symptom    Symptom or request: Pt was seen 9/8 in [facility] ER and was prescribed Cephalexin.  Pt still has urine urgency and frequency, burning sensations - No fever. Please call patient and advise.      Duration (how long have symptoms been present): 10 days of symptoms    Have you been treated for this before? No    Additional comments:     Phone Number patient can be reached at:  Home number on file [phone] (home)    Best Time:  any    Can we leave a detailed message on this number:  YES    Call taken on 9/12/2018 at 1:27 PM by Radha Peralta

## 2019-04-04 DIAGNOSIS — R56.9 CONVULSIONS, UNSPECIFIED CONVULSION TYPE (H): ICD-10-CM

## 2019-04-05 RX ORDER — CARBAMAZEPINE 200 MG/1
TABLET ORAL
Qty: 540 TABLET | Refills: 0 | Status: SHIPPED | OUTPATIENT
Start: 2019-04-05 | End: 2019-08-19

## 2019-04-05 NOTE — TELEPHONE ENCOUNTER
"Requested Prescriptions   Pending Prescriptions Disp Refills     carBAMazepine (TEGRETOL) 200 MG tablet [Pharmacy Med Name: CARBAMAZEPINE 200MG TAB] 540 tablet 1     Sig: TAKE 3 TABLETS BY MOUTH TWICE DAILY    Anti-Seizure Meds Protocol  Failed - 4/4/2019  5:44 PM       Failed - Review Authorizing provider's last note.     Refer to last progress notes: confirm request is for original authorizing provider (cannot be through other providers).         Failed - Normal CBC on file in past 26 months    Recent Labs   Lab Test 05/13/15  1710   WBC 7.0   RBC 4.14   HGB 13.6   HCT 40.0                   Failed - Normal ALT or AST on file in past 26 months    Recent Labs   Lab Test 05/13/15  1710   ALT 28     Recent Labs   Lab Test 05/13/15  1710   AST 20            Failed - Normal platelet count on file in past 26 months    Recent Labs   Lab Test 05/13/15  1710                 Passed - Recent (12 mo) or future (30 days) visit within the authorizing provider's specialty    Patient had office visit in the last 12 months or has a visit in the next 30 days with authorizing provider or within the authorizing provider's specialty.  See \"Patient Info\" tab in inbasket, or \"Choose Columns\" in Meds & Orders section of the refill encounter.             Passed - Carbamazepine level within therapeutic range in last 26 months    No lab results found.    Carbamazepine level must be checked 2-4 weeks after dosage change.           Passed - Medication is active on med list       Passed - No active pregnancy on record       Passed - No positive pregnancy test in last 12 months        Last Written Prescription Date:  12/25/15  Last Fill Quantity: 30,  # refills: 0   Last office visit: 6/28/2018 with prescribing provider:  Cale   Future Office Visit:      "

## 2019-04-07 ENCOUNTER — RECORDS - HEALTHEAST (OUTPATIENT)
Dept: ADMINISTRATIVE | Facility: OTHER | Age: 39
End: 2019-04-07

## 2019-08-18 ENCOUNTER — NURSE TRIAGE (OUTPATIENT)
Dept: NURSING | Facility: CLINIC | Age: 39
End: 2019-08-18

## 2019-08-19 ENCOUNTER — OFFICE VISIT (OUTPATIENT)
Dept: FAMILY MEDICINE | Facility: CLINIC | Age: 39
End: 2019-08-19

## 2019-08-19 VITALS
SYSTOLIC BLOOD PRESSURE: 128 MMHG | WEIGHT: 293 LBS | BODY MASS INDEX: 43.4 KG/M2 | DIASTOLIC BLOOD PRESSURE: 82 MMHG | OXYGEN SATURATION: 99 % | TEMPERATURE: 99.2 F | HEART RATE: 79 BPM | HEIGHT: 69 IN

## 2019-08-19 DIAGNOSIS — R56.9 CONVULSIONS, UNSPECIFIED CONVULSION TYPE (H): ICD-10-CM

## 2019-08-19 DIAGNOSIS — M25.561 PAIN IN BOTH KNEES, UNSPECIFIED CHRONICITY: Primary | ICD-10-CM

## 2019-08-19 DIAGNOSIS — M25.562 PAIN IN BOTH KNEES, UNSPECIFIED CHRONICITY: Primary | ICD-10-CM

## 2019-08-19 PROCEDURE — 36415 COLL VENOUS BLD VENIPUNCTURE: CPT | Performed by: PHYSICIAN ASSISTANT

## 2019-08-19 PROCEDURE — 80156 ASSAY CARBAMAZEPINE TOTAL: CPT | Performed by: PHYSICIAN ASSISTANT

## 2019-08-19 PROCEDURE — 99213 OFFICE O/P EST LOW 20 MIN: CPT | Performed by: PHYSICIAN ASSISTANT

## 2019-08-19 RX ORDER — CARBAMAZEPINE 200 MG/1
TABLET ORAL
Qty: 540 TABLET | Refills: 1 | Status: SHIPPED | OUTPATIENT
Start: 2019-08-19 | End: 2019-12-24

## 2019-08-19 ASSESSMENT — MIFFLIN-ST. JEOR: SCORE: 2292.96

## 2019-08-19 NOTE — PATIENT INSTRUCTIONS
Try the ace wraps for compression  Ice, heat  Wear your arch support inserts daily  Look into exercises for patellofemoral syndrome    Mini squats  Mini lunges  Straight leg raise  Try each ten times each leg morning and evening    Call us if you would like referral for physical therapy       Patient Education     Understanding Patellofemoral Syndrome    Patellofemoral syndrome is a condition that causes pain on the front of the knee. The large bones of the upper and lower leg meet at the knee. This joint also includes a small triangle-shaped bone that rests on top of the leg bones. This is the kneecap (patella). Patellofemoral refers to the patella and the thigh bone (femur). These bones are surrounded by connective tissue and muscles. Patellofemoral pain is believed to come from stress on the tissues of and around the knee.  What causes patellofemoral syndrome?  No single cause for patellofemoral pain has been found. But many things are likely to contribute to this type of knee pain. These include:    Actions that put repeated stress on the knee, such as running and squatting    Overtraining at a sport    Weak hip or thigh muscle    Normal variations in the way body parts fit together    Poor form during activities that stress the knee, such as running    A fall or blow to the knee  Symptoms of patellofemoral syndrome  Pain is a common symptom. It s often on the front of the knee, but can be around the kneecap. Pain can occur at certain times, such as when you are:    Running    Sitting for a long time with your knees bent, such as at a movie    Walking up or down stairs    Squatting  Other symptoms may include:    A feeling of the knee catching or locking    A grinding or crackling noise in your knee  Treatment for patellofemoral syndrome  Treatment focuses on reducing pain and avoiding further injury. Treatments may include:    Rest your leg. This gives your knee time to recover. You may need to avoid or change  the activity that caused the problem, such as not running for a while.    Prescription or over-the-counter pain medicines. These help reduce inflammation, swelling, and pain.    Cold packs. These help reduce pain.    Stretches and other exercises. These can improve balance, flexibility, and strength.    A shoe insert (orthotic). This can make your knee more stable.    Elastic tape or a brace. These can make your knee more stable.    Physical therapy. This may include exercises or other treatments.    Surgery. In rare cases, if other treatments don t relieve symptoms, you may need surgery.  Possible complications of patellofemoral syndrome  If you don t give your knee time to heal, symptoms may return or get worse. Follow your healthcare provider s instructions on resting and treating your knee.  When to call your healthcare provider  Call your healthcare provider right away if you have any of these:    Fever of 100.4 F (38 C) or higher, or as directed    Pain that gets worse    Symptoms that don t get better, or get worse    New symptoms   Date Last Reviewed: 3/10/2016    3603-5162 [employer]. [address] [place] [address], [place] [address]. All rights reserved. This information is not intended as a substitute for professional medical care. Always follow your healthcare professional's instructions.

## 2019-08-19 NOTE — TELEPHONE ENCOUNTER
Joshua () calls and says that his wife's Tegretol medication was ordered today at a closed pharmacy, in [place]. Joshua says that his wife just needs a dose of the Tegretol tonight and tomorrow am, until she sees her DrBety tomorrow, at 4 pm. Dr. Maldonado-[facility]-was then called, on his cell phone, per  answering service, and was connected with this nurse. RN then spoke to Dr. Maldonado and told the  About pt's medication being ordered at a closed pharmacy. Dr. Maldonado says that this nurse can order: Carbamazepine (Tegretol) 200 MG tablet; disp 6 tablets; refills 0, start 8/18/2019; Sig: Take 3 tablets this lilia and 3 tablets tomorrow AM, until pt. Sees her  Tomorrow at 4 pm. RN then called [facility]-in [place]on [address]-and spoke to Kim (pharmacist). RN told Kim, pt's Carbamazepine (Tegretol) order, per Dr. Maldonado. Kim says that she will get the 6 pills ready for pt. RN called Joshua back and told him that the medication was called to that [facility] and Joshua says that he will go and pick those 6 pills up now.     Reason for Disposition    [1] Follow-up call to recent contact AND [2] information only call, no triage required    Additional Information    Negative: [1] Caller is not with the adult (patient) AND [2] reporting urgent symptoms    Negative: Lab result questions    Negative: Medication questions    Negative: Caller can't be reached by phone    Negative: Caller has already spoken to PCP or another triager    Negative: RN needs further essential information from caller in order to complete triage    Negative: Requesting regular office appointment    Negative: [1] Caller requesting NON-URGENT health information AND [2] PCP's office is the best resource    Negative: Health Information question, no triage required and triager able to answer question    Negative: General information question, no triage required and triager able to answer question     Negative: Question about upcoming scheduled test, no triage required and triager able to answer question    Negative: [1] Caller is not with the adult (patient) AND [2] probable NON-URGENT symptoms    Protocols used: INFORMATION ONLY CALL-A-AH

## 2019-08-19 NOTE — PROGRESS NOTES
SUBJECTIVE:                                                    Gunnar Erwin is a 39 year old female who presents to clinic today for the following health issues:    Joint Pain    Onset: Ongoing since 2008    Description:   Location: left knee and right knee over the last month she heard loud pop of the right knee and has been painful since then  Character: Dull ache and burning constant with sharp stabbing pains, worse when she is sitting or laying down    Intensity: moderate    Progression of Symptoms: worse    Accompanying Signs & Symptoms:  Other symptoms: weakness of the leg and swelling    History:   Previous similar pain: YES- Injury in 2008      Precipitating factors:   Trauma or overuse: YES    Alleviating factors:  Improved by: acetaminophen and Naproxen, soaking in hot bath    Therapies Tried and outcome: Ice which made it worse, Tylenol and Naproxen together which has helped    * tegretol refill  Has been on this since age 16  No seizures since 2009  She did better on XR (no prodrome), but cannot afford  She does not have insurance     She has tennis shoes with inserts  History of bunions but have not bothered her  She feels she knows that her weight contributes  Stands a lot, has floor pads, walks around a lot  Often 80-90 hour work weeks  2008- twisting, pop left knee, then fall, x-ray normal 2010 but told maybe meniscus  Better for a while, when avidly hiking, then worsened when less active    Right knee not sure what happened, maybe moved or stepped wrong, big pop, then hot pain  They seem to take turns as to which knee hurts  Stairs always hurt, feel like they will lock, have given out    Problem list and histories reviewed & adjusted, as indicated.  Additional history: none    Patient Active Problem List   Diagnosis     Convulsions (H)     Generalized anxiety disorder     Obesity     Transient hypertension of pregnancy, antepartum     CARDIOVASCULAR SCREENING; LDL GOAL LESS THAN 160      "Shoulder instability     Tobacco abuse     Self-injurious behavior     Health Care Home     ASCUS of cervix with negative high risk HPV     Morbid obesity (H)     Past Surgical History:   Procedure Laterality Date     C/SECTION, CLASSICAL  2006    , Classical     C/SECTION, CLASSICAL      , Classical     TONSILLECTOMY & ADENOIDECTOMY  1991     TUBAL LIGATION  2006       Social History     Tobacco Use     Smoking status: Former Smoker     Packs/day: 0.10     Years: 7.00     Pack years: 0.70     Types: Cigarettes     Start date: 2015     Smokeless tobacco: Never Used     Tobacco comment: 3-4 ciggs per day   Substance Use Topics     Alcohol use: Yes     Comment: occ     Family History   Problem Relation Age of Onset     Hypertension Father      Depression Father      Alcohol/Drug Father      Hypertension Paternal Grandmother      Depression Paternal Grandmother      Alcohol/Drug Paternal Grandmother      Psychotic Disorder Paternal Grandmother      Hypertension Paternal Grandfather      Depression Paternal Grandfather      Cancer Paternal Grandfather         cancer around his bile duct.     Alcohol/Drug Mother      Depression Mother      Alcohol/Drug Brother      Depression Brother      Psychotic Disorder Brother      Asthma No family hx of      C.A.D. No family hx of      Diabetes No family hx of      Cerebrovascular Disease No family hx of      Breast Cancer No family hx of      Cancer - colorectal No family hx of      Prostate Cancer No family hx of            ROS:  Other than noted above, general, HEENT, respiratory, cardiac, MS, and gastrointestinal systems are negative.     OBJECTIVE:                                                    BP (!) 138/91   Pulse 79   Temp 99.2  F (37.3  C) (Tympanic)   Ht 1.753 m (5' 9\")   Wt (!) 154.9 kg (341 lb 6.4 oz)   SpO2 99%   BMI 50.42 kg/m   Body mass index is 50.42 kg/m .   GENERAL: healthy, alert, well nourished, well " hydrated, no distress  RESP: lungs clear to auscultation - no rales, no rhonchi, no wheezes  CV: regular rates and rhythm, normal S1 S2, no S3 or S4 and no murmur, no click or rub -  Knee Exam: Inspection: AP/lateral alignment normal, No effusion  Tender: bilateral medial joint line, but patient notes most pain is patellar tendon  Non-tender: no other tenderness to palpation  Active Range of Motion: decreased flexion  80 degrees, pain with flexion, full extension, pain with extension  Strength: full  Special tests: normal Valgus stress test, normal Varus, negative Lachman's test, negative Melita's, no apprehension with lateral stress of the patella, normal anterior and posterior drawer, normal medial and lateral ligaments  POSITIVE grinding positive bilaterally        ASSESSMENT/PLAN:                                                      ASSESSMENT/PLAN:      ICD-10-CM    1. Pain in both knees, unspecified chronicity M25.561     M25.562    2. Convulsions, unspecified convulsion type (H) R56.9 Carbamazepine total     carBAMazepine (TEGRETOL) 200 MG tablet     Refilled tegretol, will get level today. Recommended follow up with neurology but she does not have insurance. Care coordinator referral placed.    Patient Instructions     Try the ace wraps for compression  Ice, heat  Wear your arch support inserts daily  Look into exercises for patellofemoral syndrome    Mini squats  Mini lunges  Straight leg raise  Try each ten times each leg morning and evening    Call us if you would like referral for physical therapy     Marva Wang PA-C   [facility]

## 2019-08-20 LAB — CARBAMAZEPINE SERPL-MCNC: 8.8 MG/L (ref 4–12)

## 2019-08-21 ENCOUNTER — PATIENT OUTREACH (OUTPATIENT)
Dept: CARE COORDINATION | Facility: CLINIC | Age: 39
End: 2019-08-21

## 2019-08-21 NOTE — PROGRESS NOTES
Clinic Care Coordination Contact  [facility]/Voicemail    Referral Source: PCP  Clinical Data: Care Coordinator Outreach  Outreach attempted x 1.  Left message on voicemail with call back information and requested return call.  Plan:  Care Coordinator will try to reach patient again in 1-2 business days.    PHILIPP Escamilla, [facility] - Care Coordinator   [facility]  8/21/2019   11:11 AM  [phone]

## 2019-08-21 NOTE — LETTER
[facility] CARE COORDINATION - [facility]  [address] [place] [address]  Phone: [phone]    August 22, 2019    Gunnar Erwin  [address]  [place] [address]      Dear Gunnar,    I am a clinic care coordinator who works with the [facility]. I have been trying to reach you recently to introduce Clinic Care Coordination and to see if there was anything I could assist you with.  I wanted to introduce myself and provide you with my contact information so that you can call me with questions or concerns about your health care. Below is a description of clinic care coordination and how I can further assist you.     The clinic care coordinator is a registered nurse and/or  who understand the health care system. The goal of clinic care coordination is to help you manage your health and improve access to the [facility] system in the most efficient manner. The registered nurse can assist you in meeting your health care goals by providing education, coordinating services, and strengthening the communication among your providers. The  can assist you with financial, behavioral, psychosocial, chemical dependency, counseling, and/or psychiatric resources.    Please feel free to contact me at [phone], with any questions or concerns. We at [facility] are focused on providing you with the highest-quality healthcare experience possible and that all starts with you.     Sincerely,       PHILIPP Escamilla  [facility] - Care Coordination  [facility]   [phone]

## 2019-08-22 NOTE — PROGRESS NOTES
Clinic Care Coordination Contact  [facility]/Voicemail    Referral Source: PCP  Clinical Data: Care Coordinator Outreach  Outreach attempted x 2.  Left message on voicemail with call back information and requested return call.  Plan: Care Coordinator will mail out care coordination introduction letter with care coordinator contact information and explanation of care coordination services. Care Coordinator will try to reach patient again in 15-20 business days.    PHILIPP Escamilla, [facility] - Care Coordinator   [facility]  8/22/2019   1:10 PM  [phone]

## 2019-09-19 NOTE — PROGRESS NOTES
Clinic Care Coordination Contact    Call placed to patient for initial outreach.  She said since that initial appointment she has a job and will be getting insurance she denied any other concerns and politely ended the call within the first 2 minutes.    No planned outreach.  Patient was encouraged to call  if there are any new concerns at a later date.    PHILIPP Escamilla, [facility] - Care Coordinator   [facility]  9/19/2019   10:07 AM  [phone]

## 2019-12-07 DIAGNOSIS — R56.9 CONVULSIONS, UNSPECIFIED CONVULSION TYPE (H): ICD-10-CM

## 2019-12-09 NOTE — TELEPHONE ENCOUNTER
"Requested Prescriptions   Pending Prescriptions Disp Refills     carBAMazepine (TEGRETOL) 200 MG tablet [Pharmacy Med Name: CARBAMAZEPINE 200MG TAB] 72 tablet 14     Sig: TAKE 3 TABLETS BY MOUTH TWICE DAILY       Anti-Seizure Meds Protocol  Failed - 12/7/2019  9:34 AM        Failed - Recent (12 mo) or future (30 days) visit within the authorizing provider's specialty     Patient has had an office visit with the authorizing provider or a provider within the authorizing providers department within the previous 12 mos or has a future within next 30 days. See \"Patient Info\" tab in inbasket, or \"Choose Columns\" in Meds & Orders section of the refill encounter.              Failed - Review Authorizing provider's last note.      Refer to last progress notes: confirm request is for original authorizing provider (cannot be through other providers).          Failed - Normal CBC on file in past 26 months     Recent Labs   Lab Test 05/13/15  1710   WBC 7.0   RBC 4.14   HGB 13.6   HCT 40.0                    Failed - Normal ALT or AST on file in past 26 months     Recent Labs   Lab Test 05/13/15  1710   ALT 28     Recent Labs   Lab Test 05/13/15  1710   AST 20             Failed - Normal platelet count on file in past 26 months     Recent Labs   Lab Test 05/13/15  1710                  Passed - Carbamazepine level within therapeutic range in last 26 months     No lab results found.    Carbamazepine level must be checked 2-4 weeks after dosage change.            Passed - Medication is active on med list        Passed - No active pregnancy on record        Passed - No positive pregnancy test in last 12 months        Last Written Prescription Date:  8/19/2019  Last Fill Quantity: 540,  # refills: 1   Last office visit: 6/28/2018 with prescribing provider:  Mila    8/29/2019 with Kathleen  Future Office Visit:      "

## 2019-12-10 RX ORDER — CARBAMAZEPINE 200 MG/1
TABLET ORAL
Qty: 72 TABLET | Refills: 14 | OUTPATIENT
Start: 2019-12-10

## 2020-01-29 ENCOUNTER — TELEPHONE (OUTPATIENT)
Dept: FAMILY MEDICINE | Facility: CLINIC | Age: 40
End: 2020-01-29

## 2020-01-29 DIAGNOSIS — R56.9 CONVULSIONS, UNSPECIFIED CONVULSION TYPE (H): ICD-10-CM

## 2020-01-30 RX ORDER — CARBAMAZEPINE 200 MG/1
TABLET ORAL
Qty: 180 TABLET | Refills: 0 | Status: SHIPPED | OUTPATIENT
Start: 2020-01-30 | End: 2020-02-28

## 2020-02-28 DIAGNOSIS — R56.9 CONVULSIONS, UNSPECIFIED CONVULSION TYPE (H): ICD-10-CM

## 2020-02-28 RX ORDER — CARBAMAZEPINE 200 MG/1
TABLET ORAL
Qty: 180 TABLET | Refills: 0 | Status: SHIPPED | OUTPATIENT
Start: 2020-02-28 | End: 2020-03-30

## 2020-02-28 NOTE — TELEPHONE ENCOUNTER
February 28, 2020       1:08 PM   Brittanie Herr routed this conversation to [facility]   Brittanie Herr           1:08 PM   Note      Pt has called and left a message to refill this medication she states she can now refill and make a appt as she now has insurance.  She would like to refill this today.  [facility] in [place].  [phone] Gunnar     Also notes her primary is vossen?     Brittanie Herr  [facility]

## 2020-02-28 NOTE — TELEPHONE ENCOUNTER
Pt has called and left a message to refill this medication she states she can now refill and make a appt as she now has insurance.  She would like to refill this today.  [facility] in [place].  [phone] Gunnar    Also notes her primary is vossen?    [facility]  [facility]

## 2020-03-02 NOTE — TELEPHONE ENCOUNTER
carBAMazepine (TEGRETOL) 200 MG tablet 180 tablet 0 2/28/2020  No   Sig: TAKE 3 TABLETS BY MOUTH TWICE DAILY   Sent to pharmacy as: carBAMazepine (TEGRETOL) 200 MG tablet   Class: E-Prescribe   Order: [id number]   E-Prescribing Status: Receipt confirmed by pharmacy (2/28/2020  4:11 PM CST)   Printout Tracking     External Result Report   Medication Administration Instructions     TAKE 3 TABLETS BY MOUTH TWICE DAILY   Pharmacy     [facility] [address] [place] [address] E

## 2020-03-18 ENCOUNTER — VIRTUAL VISIT (OUTPATIENT)
Dept: FAMILY MEDICINE | Facility: OTHER | Age: 40
End: 2020-03-18

## 2020-03-18 NOTE — PROGRESS NOTES
"Date: 2020 00:36:48  Clinician: Earlene Rodrigez  Clinician NPI: [id number]  Patient: Gunnar Erwin  Patient : 1980  Patient Address: [facility] [address], [place] [address]  Patient Phone: [phone]  Visit Protocol: URI  Patient Summary:  Gunnar is a 39 year old ( : 1980 ) female who initiated a Visit for COVID-19 (Coronavirus) evaluation and screening. When asked the question \"Please sign me up to receive news, health information and promotions. \", Gunnar responded \"No\".    Gunnar states her symptoms started gradually 3-6 days ago.   Her symptoms consist of wheezing, a sore throat, a cough, malaise, and myalgia. She is experiencing mild difficulty breathing with activities but can speak normally in full sentences.   Symptom details     Cough: Gunnar coughs every 5-10 minutes and her cough is not more bothersome at night. Phlegm does not come into her throat when she coughs. She does not believe her cough is caused by post-nasal drip.     Sore throat: Gunnar reports having mild throat pain (1-3 on a 10 point pain scale), does not have exudate on her tonsils, and can swallow liquids. The lymph nodes in her neck are not enlarged. A rash has not appeared on the skin since the sore throat started.     Wheezing: Gunnar has not ever been diagnosed with asthma. The wheezing does not interfere with her normal daily activities.     Gunnar denies having nasal congestion, fever, ear pain, headache, rhinitis, enlarged lymph nodes, facial pain or pressure, chills, and teeth pain. She also denies taking antibiotic medication for the symptoms, having recent facial or sinus surgery in the past 60 days, and double sickening (worsening symptoms after initial improvement).   Precipitating events  Gunnar is not sure if she has been exposed to someone with strep throat. She has not recently been exposed to someone with influenza. Gunnar has been in close contact with the following high risk " individuals: pregnant women.   Pertinent COVID-19 (Coronavirus) information  Gunnar has not traveled internationally or to the areas where COVID-19 (Coronavirus) is widespread in the last 14 days before the start of her symptoms.   Gunnar has not had a close contact with a laboratory-confirmed COVID-19 patient within 14 days of symptom onset. She also has not had a close contact with a suspected COVID-19 patient within 14 days of symptom onset.   Gunnar is not a healthcare worker and does not work in a healthcare facility.   Pertinent medical history  Gunnar typically gets a yeast infection when she takes antibiotics. She has used fluconazole (Diflucan) to treat previous yeast infections. 2 doses of fluconazole (Diflucan) has typically been needed for symptoms to resolve in the past.  Gunnar needs a return to work/school note.   Weight: 290 lbs   Gunnar smokes or uses smokeless tobacco.   She denies pregnancy and denies breastfeeding. She has menstruated in the past month.   Weight: 290 lbs    MEDICATIONS: carbamazepine oral, ALLERGIES: NKDA  Clinician Response:  Dear Gunnar,   Based on the information you have provided, you do have symptoms that are consistent with Coronavirus (COVID-19).  The coronavirus causes mild to severe respiratory illness with the most common symptoms including fever, cough and difficulty breathing. Unfortunately, many viruses cause similar symptoms and it can be difficult to distinguish between viruses, especially in mild cases, so we are presuming that anyone with cough or fever has coronavirus at this time.  Coronavirus/COVID-19 has reached the point of community spread in [place], meaning that we are finding the virus in people with no known exposure risk for naida the virus. Given the increasing commonness of coronavirus in the community we are no longer testing patients who are not critically ill.  For everyone else who has cough or fever, you should assume you  are infected with coronavirus. Accordingly, you should self-quarantine for fourteen days from the first day your symptoms started. You should call if you find increasing shortness of breath, wheezing or sustained fever above 101.5. If you are significantly short of breath or experience chest pain you should call 911 or report to the nearest emergency department for urgent evaluation.    Isolate yourself at home.   Do Not allow any visitors  Do Not go to work or school  Do Not go to [employer],  centers, shopping, or other public places.  Do Not shake hands.  Avoid close contact with others (hugging, kissing).   Protect Others:    Cover Your Mouth and Nose with a mask, disposable tissue or wash cloth to avoid spreading germs to others.  Wash your hands and face frequently with soap and water.   If you develop significant shortness of breath that prevents you from doing normal activities, please call 911 or proceed to the nearest emergency room and alert them immediately that you have been in self-isolation for possible coronavirus.   For more information about COVID19 and options for caring for yourself at home, please visit the CDC website at https://www.cdc.gov/coronavirus/2019-ncov/about/steps-when-sick.htmlFor more options for care at [facility], please visit our website at [email or web address]     COVID-19 (Coronavirus) General Information  With the increase in the number of COVID-19 (Coronavirus) cases, we understand you may have some questions. Below is some helpful information on COVID-19 (Coronavirus).  How can I protect myself and others from the COVID-19 (Coronavirus)?  Because there is currently no vaccine to prevent infection, the best way to protect yourself is to avoid being exposed to this virus. Put distance between yourself and other people if COVID-19 (Coronavirus) is spreading in your community. The virus is thought to spread mainly from person-to-person.      Between people who are in close contact with one another (within about 6 about) for prolonged period (10 minutes or longer).    Through respiratory droplets produced when an infected person coughs or sneezes.     The CDC recommends the following additional steps to protect yourself and others:     Wash your hands often with soap and water for at least 20 seconds, especially after blowing your nose, coughing, or sneezing; going to the bathroom; and before eating or preparing food.  Use an alcohol-based hand  that contains at least 60 percent alcohol if soap and water are not available.        Avoid touching your eyes, nose and mouth with unwashed hands.    Avoid close contact with people who are sick.    Stay home when you are sick.    Cover your cough or sneeze with a tissue, then throw the tissue in the trash.    Clean and disinfect frequently touched objects and surfaces.     You can help stop COVID-19 (Coronavirus) by knowing the signs and symptoms:     Fever    Cough    Shortness of breath     Contact your healthcare provider if   Develop symptoms   AND   Have been in close contact with a person known to have COVID-19 (Coronavirus) or live in or have recently traveled from an area with ongoing spread of COVID-19 (Coronavirus). Call ahead before you go to a doctor's office or emergency room. Tell them about your recent travel and your symptoms.   For the most up to date information, visit the CDC's website.  Steps to help prevent the spread of COVID-19 (Coronavirus) if you are sick  If you are sick with COVID-19 (Coronavirus) or suspect you are infected with the virus that causes COVID-19 (Coronavirus), follow the steps below to help prevent the disease from spreading&nbsp;to people in your home and community.     Stay home except to get medical care. Home isolation may be started in consultation with your healthcare clinician.    Separate yourself from other people and animals in your home.    Call  "ahead before visiting your doctor if you have a medical appointment.    Wear a facemask when you are around other people.    Cover your cough and sneezes.    Clean your hands often.    Avoid sharing personal household items.    Clean and disinfect frequently touched objects and surfaces everyday.    You will need to have someone drop off medications or household supplies (if needed) at your house without coming inside or in contact with you or others living in your house.    Monitor your symptoms and seek prompt medical care if your illness is worsening (e.g. Difficulty breathing).    Discontinue home isolation only in consultation with your healthcare provider.     For more detailed and up to date information on what to do if you are sick, visit this link: What to Do If You Are Sick With Coronavirus Disease 2019 (COVID-19).  Do I need to be tested for COVID-19 (Coronavirus)?     At this time, the limited number of tests available are controlled by the state and local health departments and are being reserved for more seriously ill patients, those with known exposure to confirmed patients, and those with recent travel (within 14 days) to countries with high rates of COVID-19 (Coronavirus).    Decisions on which patients receive testing will be based on the local spread of COVID-19 (Coronavirus) as well as the symptoms. Your healthcare provider will make the final decision on whether you should be tested.    In the meantime, if you have concerns that you may have been exposed, it is reasonable to practice \"social distancing.\"&nbsp; If you are ill with a cold or flu-like illness, please monitor your symptoms and reach out to your healthcare provider if your symptoms worsen.    For more up to date information, visit this link: COVID-19 (Coronavirus) Frequently Asked Questions and Answers.      Diagnosis: Cough  Diagnosis ICD: R05  "

## 2020-03-30 DIAGNOSIS — R56.9 CONVULSIONS, UNSPECIFIED CONVULSION TYPE (H): ICD-10-CM

## 2020-03-30 RX ORDER — CARBAMAZEPINE 200 MG/1
TABLET ORAL
Qty: 180 TABLET | Refills: 0 | Status: SHIPPED | OUTPATIENT
Start: 2020-03-30 | End: 2020-05-05

## 2020-03-30 NOTE — TELEPHONE ENCOUNTER
"carBAMazepine (TEGRETOL) 200 MG tablet      Last Written Prescription Date:  2/28/20  Last Fill Quantity: 180,   # refills: 0  Last Office Visit: 8/19/19  Future Office visit:       Requested Prescriptions   Pending Prescriptions Disp Refills     carBAMazepine (TEGRETOL) 200 MG tablet 180 tablet 0     Sig: TAKE 3 TABLETS BY MOUTH TWICE DAILY       Anti-Seizure Meds Protocol  Failed - 3/30/2020 10:14 AM        Failed - Review Authorizing provider's last note.      Refer to last progress notes: confirm request is for original authorizing provider (cannot be through other providers).          Failed - Normal CBC on file in past 26 months     Recent Labs   Lab Test 05/13/15  1710   WBC 7.0   RBC 4.14   HGB 13.6   HCT 40.0                    Failed - Normal ALT or AST on file in past 26 months     Recent Labs   Lab Test 05/13/15  1710   ALT 28     Recent Labs   Lab Test 05/13/15  1710   AST 20             Failed - Normal platelet count on file in past 26 months     Recent Labs   Lab Test 05/13/15  1710                  Passed - Recent (12 mo) or future (30 days) visit within the authorizing provider's specialty     Patient has had an office visit with the authorizing provider or a provider within the authorizing providers department within the previous 12 mos or has a future within next 30 days. See \"Patient Info\" tab in inbasket, or \"Choose Columns\" in Meds & Orders section of the refill encounter.              Passed - Carbamazepine level within therapeutic range in last 26 months     No lab results found.    Carbamazepine level must be checked 2-4 weeks after dosage change.            Passed - Medication is active on med list        Passed - No active pregnancy on record        Passed - No positive pregnancy test in last 12 months             "

## 2020-03-30 NOTE — TELEPHONE ENCOUNTER
Routing refill request to provider for review/approval because:  Labs not current:  See below    Emma TELLEZ RN

## 2020-05-02 DIAGNOSIS — R56.9 CONVULSIONS, UNSPECIFIED CONVULSION TYPE (H): ICD-10-CM

## 2020-05-05 RX ORDER — CARBAMAZEPINE 200 MG/1
TABLET ORAL
Qty: 180 TABLET | Refills: 0 | Status: SHIPPED | OUTPATIENT
Start: 2020-05-05 | End: 2020-06-05

## 2020-05-05 NOTE — TELEPHONE ENCOUNTER
Routing refill request to provider for review/approval because:  Drug not on the [facility] refill protocol   Javier Luna RN

## 2020-05-05 NOTE — TELEPHONE ENCOUNTER
Message left on patient's vm advising her of med order and to schedule a visit with the neurologist. If she cannot get in within a month; need appointment with Provider here and to call [phone] to schedule.  Javier Luna RN

## 2020-05-05 NOTE — TELEPHONE ENCOUNTER
Was to schedule f/u with neurology and/or due for labs/visit so will refill x1 month but needs to be seen    Melissa

## 2020-05-27 ENCOUNTER — VIRTUAL VISIT (OUTPATIENT)
Dept: FAMILY MEDICINE | Facility: OTHER | Age: 40
End: 2020-05-27

## 2020-05-27 NOTE — PROGRESS NOTES
"Date: 2020 10:33:30  Clinician: Ramon Recinos  Clinician NPI: [id number]  Patient: Gunnar Erwin  Patient : 1980  Patient Address: [address] [place], [place] [address] [address]  Patient Phone: [phone]  Visit Protocol: URI  Patient Summary:  Gunnar is a 39 year old ( : 1980 ) female who initiated a Visit for COVID-19 (Coronavirus) evaluation and screening. When asked the question \"Please sign me up to receive news, health information and promotions. \", Gunnar responded \"No\".    Gunnar states her symptoms started 1-2 days ago.   Her symptoms consist of wheezing, ageusia, diarrhea, a sore throat, malaise, myalgia, anosmia, a cough, nasal congestion, vomiting, rhinitis, and a headache. She is experiencing mild difficulty breathing with activities but can speak normally in full sentences. Gunnar also feels feverish.   Symptom details     Nasal secretions: The color of her mucus is yellow.    Cough: Gunnar coughs almost every minute and her cough is not more bothersome at night. Phlegm comes into her throat when she coughs. She does not believe her cough is caused by post-nasal drip. The color of the phlegm is yellow.     Sore throat: Gunnar reports having moderate throat pain (4-6 on a 10 point pain scale), does not have exudate on her tonsils, and can swallow liquids. The lymph nodes in her neck are not enlarged. A rash has not appeared on the skin since the sore throat started.     Temperature: Her current temperature is 100 degrees Fahrenheit.     Wheezing: Gunnar has not ever been diagnosed with asthma. Additional wheezing details as reported by the patient (free text): I have a high tolerance.  It's just a wheezy, rattling sound       Headache: She states the headache is mild (1-3 on a 10 point pain scale).      Gunnar denies having nausea, teeth pain, enlarged lymph nodes, facial pain or pressure, ear pain, and chills. She also denies having recent facial or sinus surgery in " the past 60 days, taking antibiotic medication for the symptoms, and having a sinus infection within the past year.   Precipitating events  Within the past week, Gunnar has not been exposed to someone with strep throat. She has not recently been exposed to someone with influenza. Gunnar has been in close contact with the following high risk individuals: immunocompromised people, adults 65 or older, pregnant women, children under the age of 5, and people with asthma, heart disease or diabetes.   Pertinent COVID-19 (Coronavirus) information  In the past 14 days, Gunnar has not worked in a congregate living setting.   She does not work or volunteer as healthcare worker or a  and does not work or volunteer in a healthcare facility.   Gunnar also has not lived in a congregate living setting in the past 14 days. She does not live with a healthcare worker.   Gunnar has had a close contact with a laboratory-confirmed COVID-19 patient within 14 days of symptom onset. Additional information about contact with COVID-19 (Coronavirus) patient as reported by the patient (free text): A girl I work with was confirmed.  I have 3 other coworkers getting tested and are awaiting results   Pertinent medical history  Gunnar typically gets a yeast infection when she takes antibiotics. She has used fluconazole (Diflucan) to treat previous yeast infections. 1 dose of fluconazole (Diflucan) has typically been sufficient for symptoms to resolve in the past.   Gunnar needs a return to work/school note.   Weight: 275 lbs   Gunnar smokes or uses smokeless tobacco.   She denies pregnancy and denies breastfeeding. She is currently menstruating.   Weight: 275 lbs    MEDICATIONS: carbamazepine oral, ALLERGIES: NKDA  Clinician Response:  Dear Gunnar,      Your symptoms show that you may have coronavirus (COVID-19). This illness can cause fever, cough and trouble breathing. Many people get a mild case and get better on  their own. Some people can get very sick.  Will I be tested for COVID-19?  Because we have limited testing supplies we are not testing everyone if they are low risk. We are testing if:   You are very ill. For example, you're on chemotherapy, dialysis or home hospice care. (Contact your specialty clinic or program.)   You live in a nursing home or other long-term care facility. (Talk to your nurse manager or medical director.)   You're a health care worker. ([facility] employees Contact our employee health office for testing.)   We are performing limited curbside testing for healthcare/first responders and people with medical problems that put them at increased risk. It does not appear by the [facility] information you submitted that you meet any of these criteria. If there are medical problems that we did not know about, please repeat an [facility] visit and let us know what medical conditions you have.  How can I protect others?  Without a test, we can't know for sure that you have COVID-19. For safety, it's very important to follow these rules.  First, stay home and away from others (self-isolate) until:   You've had no fever---and no medicine that reduces fever---for 3 full days (72 hours). And...    Your other symptoms have gotten better. For example, your cough or breathing has improved. And...   At least 10 days have passed since your symptoms started.   During this time:   Don't go to work, school or anywhere else.    Stay away from others in your home. No hugging, kissing or shaking hands.   Don't let anyone visit.   Cover your mouth and nose with a mask, tissue or wash cloth to avoid spreading germs.   Wash your hands and face often. Use soap and water.   How can I take care of myself?  1.Take Tylenol (acetaminophen) for fever or pain. If you have liver or kidney problems, ask your family doctor if it's okay to take Tylenol.   Adults can take either:    650 mg (two 325 mg pills) every 4 to 6 hours, or...    1,000 mg (two 500 mg pills) every 8 hours as needed.    Note: Don't take more than 3,000 mg in one day.  For children, check the Tylenol bottle for the right dose. The dose is based on the child's age or weight.   2.If you have other health problems (like cancer, heart failure, an organ transplant or severe kidney disease): Call your specialty clinic if you don't feel better in the next 2 days.  3.Know when to call 911: If your breathing is so bad that it keeps you from doing normal activities, call 911 or go to the emergency room. Tell them that you've been staying home and may have COVID-19.  4.Sign up for [employer]. We know it's scary to hear that you might have COVID-19. We want to track your symptoms to make sure you're okay over the next 2 weeks. Please look for an email from [employer]---this is a free, online program that we'll use to keep in touch. To sign up, follow the link in the email. Learn more at [email or web address].  Where can I get more information?  To learn more about COVID-19 and how to care for yourself at home, please visit the CDC website at https://www.cdc.gov/coronavirus/2019-ncov/about/steps-when-sick.html.  For more options for care at [facility], please visit our website at [email or web address]   If you are interested in becoming part of a [facility] clinic trial related to COVID19 please go to [email or web address] for information, if you qualify.     Diagnosis: Wheezing  Diagnosis ICD: R06.2  Prescription: albuterol sulfate (Proventil HFA) 90 mcg/actuation inhalation HFA aerosol inhaler 1 200 inhalation aerosol with adapter (proventil hfa or equivalent), 0 days supply. Inhale 2 puffs every 4 hours as needed. Refills: 0, Refill as needed: no, Allow substitutions: yes

## 2020-06-05 ENCOUNTER — VIRTUAL VISIT (OUTPATIENT)
Dept: FAMILY MEDICINE | Facility: CLINIC | Age: 40
End: 2020-06-05
Payer: COMMERCIAL

## 2020-06-05 DIAGNOSIS — R56.9 CONVULSIONS, UNSPECIFIED CONVULSION TYPE (H): ICD-10-CM

## 2020-06-05 PROCEDURE — 99213 OFFICE O/P EST LOW 20 MIN: CPT | Mod: 95 | Performed by: NURSE PRACTITIONER

## 2020-06-05 RX ORDER — CARBAMAZEPINE 200 MG/1
600 TABLET ORAL 2 TIMES DAILY
Qty: 540 TABLET | Refills: 3 | Status: SHIPPED | OUTPATIENT
Start: 2020-06-05 | End: 2021-05-19

## 2020-06-05 NOTE — PROGRESS NOTES
"Gunnar Erwin is a 39 year old female who is being evaluated via a billable video visit.      The patient has been notified of following:     \"This video visit will be conducted via a call between you and your physician/provider. We have found that certain health care needs can be provided without the need for an in-person physical exam.  This service lets us provide the care you need with a video conversation.  If a prescription is necessary we can send it directly to your pharmacy.  If lab work is needed we can place an order for that and you can then stop by our lab to have the test done at a later time.    Video visits are billed at different rates depending on your insurance coverage.  Please reach out to your insurance provider with any questions.    If during the course of the call the physician/provider feels a video visit is not appropriate, you will not be charged for this service.\"    Patient has given verbal consent for Video visit? Yes    How would you like to obtain your AVS? [facility]    Patient would like the video invitation sent by: [phone]     Will anyone else be joining your video visit? No    Subjective     Gunnar Erwin is a 39 year old female who presents today via video visit for the following health issues:    HPI  Medication Followup of Tegretol     Taking Medication as prescribed: yes    Side Effects:  None    Medication Helping Symptoms:  yes          Video Start Time: 8:55 AM      Reviewed and updated as needed this visit by Provider         Review of Systems   Constitutional, HEENT, cardiovascular, pulmonary, gi and gu systems are negative, except as otherwise noted.      Objective    There were no vitals taken for this visit.  Estimated body mass index is 50.42 kg/m  as calculated from the following:    Height as of 8/19/19: 1.753 m (5' 9\").    Weight as of 8/19/19: 154.9 kg (341 lb 6.4 oz).  Physical Exam     GENERAL: Healthy, alert and no distress  EYES: Eyes grossly " normal to inspection.  No discharge or erythema, or obvious scleral/conjunctival abnormalities.  RESP: No audible wheeze, cough, or visible cyanosis.  No visible retractions or increased work of breathing.    SKIN: Visible skin clear. No significant rash, abnormal pigmentation or lesions.  NEURO: Cranial nerves grossly intact.  Mentation and speech appropriate for age.  PSYCH: Mentation appears normal, affect normal/bright, judgement and insight intact, normal speech and appearance well-groomed.      Diagnostic Test Results:  Labs reviewed in Epic        Assessment & Plan     1. Convulsions, unspecified convulsion type (H)  -well controlled, no seizures per patient report since early 2000'  -no side effects, refill provided, recommend to check labs and Tegretol level, the patient will schedule lab appointment next week   -also, recommended to follow up with neurologist sometimes in the near future  - carBAMazepine (TEGRETOL) 200 MG tablet; Take 3 tablets (600 mg) by mouth 2 times daily  Dispense: 540 tablet; Refill: 3  - CBC with platelets and differential; Future  - Carbamazepine total; Future  - Comprehensive metabolic panel (BMP + Alb, Alk Phos, ALT, AST, Total. Bili, TP); Future         See Patient Instructions    Return in about 1 year (around 6/5/2021) for Routine Visit.    DIONNA Martins CNP  [facility]      Video-Visit Details    Type of service:  Video Visit    Video End Time:9:05 AM    Originating Location (pt. Location): Home    Distant Location (provider location):  [facility]     Platform used for Video Visit: [facility]    Return in about 1 year (around 6/5/2021) for Routine Visit.       DIONNA Martins CNP

## 2020-07-23 ENCOUNTER — OFFICE VISIT (OUTPATIENT)
Dept: FAMILY MEDICINE | Facility: CLINIC | Age: 40
End: 2020-07-23
Payer: COMMERCIAL

## 2020-07-23 VITALS
RESPIRATION RATE: 14 BRPM | SYSTOLIC BLOOD PRESSURE: 126 MMHG | HEART RATE: 87 BPM | DIASTOLIC BLOOD PRESSURE: 74 MMHG | BODY MASS INDEX: 43.4 KG/M2 | TEMPERATURE: 99.2 F | HEIGHT: 69 IN | WEIGHT: 293 LBS | OXYGEN SATURATION: 98 %

## 2020-07-23 DIAGNOSIS — K64.4 EXTERNAL HEMORRHOIDS: Primary | ICD-10-CM

## 2020-07-23 DIAGNOSIS — R56.9 CONVULSIONS, UNSPECIFIED CONVULSION TYPE (H): ICD-10-CM

## 2020-07-23 LAB
ALBUMIN SERPL-MCNC: 3.6 G/DL (ref 3.4–5)
ALP SERPL-CCNC: 73 U/L (ref 40–150)
ALT SERPL W P-5'-P-CCNC: 24 U/L (ref 0–50)
ANION GAP SERPL CALCULATED.3IONS-SCNC: 3 MMOL/L (ref 3–14)
AST SERPL W P-5'-P-CCNC: 16 U/L (ref 0–45)
BILIRUB SERPL-MCNC: 0.3 MG/DL (ref 0.2–1.3)
BUN SERPL-MCNC: 16 MG/DL (ref 7–30)
CALCIUM SERPL-MCNC: 8.4 MG/DL (ref 8.5–10.1)
CARBAMAZEPINE SERPL-MCNC: 8.3 MG/L (ref 4–12)
CHLORIDE SERPL-SCNC: 107 MMOL/L (ref 94–109)
CO2 SERPL-SCNC: 27 MMOL/L (ref 20–32)
CREAT SERPL-MCNC: 0.69 MG/DL (ref 0.52–1.04)
ERYTHROCYTE [DISTWIDTH] IN BLOOD BY AUTOMATED COUNT: 12.1 % (ref 10–15)
GFR SERPL CREATININE-BSD FRML MDRD: >90 ML/MIN/{1.73_M2}
GLUCOSE SERPL-MCNC: 83 MG/DL (ref 70–99)
HCT VFR BLD AUTO: 38.3 % (ref 35–47)
HGB BLD-MCNC: 12.8 G/DL (ref 11.7–15.7)
MCH RBC QN AUTO: 32.1 PG (ref 26.5–33)
MCHC RBC AUTO-ENTMCNC: 33.4 G/DL (ref 31.5–36.5)
MCV RBC AUTO: 96 FL (ref 78–100)
PLATELET # BLD AUTO: 272 10E9/L (ref 150–450)
POTASSIUM SERPL-SCNC: 3.9 MMOL/L (ref 3.4–5.3)
PROT SERPL-MCNC: 6.9 G/DL (ref 6.8–8.8)
RBC # BLD AUTO: 3.99 10E12/L (ref 3.8–5.2)
SODIUM SERPL-SCNC: 137 MMOL/L (ref 133–144)
WBC # BLD AUTO: 7.5 10E9/L (ref 4–11)

## 2020-07-23 PROCEDURE — 80053 COMPREHEN METABOLIC PANEL: CPT | Performed by: NURSE PRACTITIONER

## 2020-07-23 PROCEDURE — 80156 ASSAY CARBAMAZEPINE TOTAL: CPT | Performed by: NURSE PRACTITIONER

## 2020-07-23 PROCEDURE — 36415 COLL VENOUS BLD VENIPUNCTURE: CPT | Performed by: NURSE PRACTITIONER

## 2020-07-23 PROCEDURE — 85027 COMPLETE CBC AUTOMATED: CPT | Performed by: NURSE PRACTITIONER

## 2020-07-23 PROCEDURE — 99213 OFFICE O/P EST LOW 20 MIN: CPT | Performed by: NURSE PRACTITIONER

## 2020-07-23 ASSESSMENT — MIFFLIN-ST. JEOR: SCORE: 2223.77

## 2020-07-23 NOTE — PROGRESS NOTES
Subjective     uGnnar Erwin is a 40 year old female who presents to clinic today for the following health issues:    HPI       Hemorrhoids  Onset: has had one for 1.5 years.    Description:   Pain: no   Itching: YES    Accompanying Signs & Symptoms:  Blood streaked toilet paper: YES  Blood in stool: no   Changes in stool pattern: YES - pt states that she has lost weight in the last few months so her stools have changed.     History:   Any previous GI studies done:none  Family History of colon cancer: no     Precipitating factors:   diet    Alleviating factors:  diet    Therapies Tried and outcome: preparation H and diet changes.         Patient Active Problem List   Diagnosis     Convulsions (H)     Generalized anxiety disorder     Obesity     Transient hypertension of pregnancy, antepartum     CARDIOVASCULAR SCREENING; LDL GOAL LESS THAN 160     Shoulder instability     Tobacco abuse     Self-injurious behavior     Health Care Home     ASCUS of cervix with negative high risk HPV     Morbid obesity (H)     Past Surgical History:   Procedure Laterality Date     C/SECTION, CLASSICAL  2006    , Classical     C/SECTION, CLASSICAL      , Classical     TONSILLECTOMY & ADENOIDECTOMY  1991     TUBAL LIGATION  2006       Social History     Tobacco Use     Smoking status: Former Smoker     Packs/day: 0.10     Years: 7.00     Pack years: 0.70     Types: Cigarettes     Start date: 2015     Smokeless tobacco: Never Used     Tobacco comment: 3-4 ciggs per day   Substance Use Topics     Alcohol use: Yes     Comment: occ     Family History   Problem Relation Age of Onset     Hypertension Father      Depression Father      Alcohol/Drug Father      Hypertension Paternal Grandmother      Depression Paternal Grandmother      Alcohol/Drug Paternal Grandmother      Psychotic Disorder Paternal Grandmother      Hypertension Paternal Grandfather      Depression Paternal Grandfather   "    Cancer Paternal Grandfather         cancer around his bile duct.     Alcohol/Drug Mother      Depression Mother      Scoliosis Mother      Alcohol/Drug Brother      Depression Brother      Psychotic Disorder Brother      Asthma No family hx of      C.A.D. No family hx of      Diabetes No family hx of      Cerebrovascular Disease No family hx of      Breast Cancer No family hx of      Cancer - colorectal No family hx of      Prostate Cancer No family hx of            Reviewed and updated as needed this visit by Provider         Review of Systems   Constitutional, HEENT, cardiovascular, pulmonary, gi and gu systems are negative, except as otherwise noted.      Objective    Resp 14   Ht 1.74 m (5' 8.5\")   Wt 149.2 kg (329 lb)   BMI 49.30 kg/m    Body mass index is 49.3 kg/m .  Physical Exam   GENERAL: healthy, alert and no distress  EYES: Eyes grossly normal to inspection, PERRL and conjunctivae and sclerae normal  CV: regular rates and rhythm and normal S1 S2, no S3 or S4  ABDOMEN: soft, nontender  NEURO: Normal strength and tone, mentation intact and speech normal    Diagnostic Test Results:  none         Assessment & Plan       ICD-10-CM    1. External hemorrhoids  K64.4 GENERAL SURG ADULT REFERRAL   2. Convulsions, unspecified convulsion type (H)  R56.9 Comprehensive metabolic panel (BMP + Alb, Alk Phos, ALT, AST, Total. Bili, TP)     CBC with platelets     Carbamazepine total        BMI:   Estimated body mass index is 49.3 kg/m  as calculated from the following:    Height as of this encounter: 1.74 m (5' 8.5\").    Weight as of this encounter: 149.2 kg (329 lb).   Weight management plan: Discussed healthy diet and exercise guidelines        CONSULTATION/REFERRAL to GEN SURG for hemorrhoid mgmt    FUTURE APPOINTMENTS:       - Follow-up for annual visit or as needed.    Work on weight loss  Regular exercise  See Patient Instructions    No follow-ups on file.    DIONNA Rae [facility] " [place]

## 2020-07-23 NOTE — PATIENT INSTRUCTIONS
Patient Education     Hemorrhoids    Hemorrhoids are swollen and inflamed veins inside the rectum and near the anus. The rectum is the last several inches of the colon. The anus is the passage between the rectum and the outside of the body.  Causes  The veins can become swollen due to increased pressure in them. This is most often caused by:    Chronic constipation or diarrhea    Straining when having a bowel movement    Sitting too long on the toilet    A low-fiber diet    Pregnancy  Symptoms    Bleeding from the rectum (this may be noticeable after bowel movements)    Lump near the anus    Itching around the anus    Pain around the anus  There are different types of hemorrhoids. Depending on the type you have and the severity, you may be able to treat yourself at home. In some cases, a procedure may be the best treatment option. Your healthcare provider can tell you more about this, if needed.  Home care  General care    To get relief from pain or itching, try:  ? Medicines. Your healthcare provider may recommend stool softeners, suppositories, or laxatives to help manage constipation. Use these exactly as directed.  ? Sitz baths. A sitz bath involves sitting in a few inches of warm bath water. Be careful not to make the water so hot that you burn yourself--test it before sitting in it. Soak for about 10 to 15 minutes a few times a day. This may help relieve pain.  ? Topical products. Your healthcare provider may prescribe or recommend creams, ointments, or pads that can be applied to the hemorrhoid. Use these exactly as directed.  Tips to help prevent hemorrhoids    Eat more fiber. Fiber adds bulk to stool and absorbs water as it moves through your colon. This makes stool softer and easier to pass.  ? Increase the fiber in your diet with more fiber-rich foods. These include fresh fruit, vegetables, and whole grains.  ? Take a fiber supplement or bulking agent, if advised by your healthcare provider. These  include products such as psyllium or methylcellulose.    Drink more water. Your healthcare provider may direct you to drink plenty of water. This can help keep stool soft.    Be more active. Frequent exercise aids digestion and helps prevent constipation. It may also help make bowel movements more regular.    Don t strain during bowel movements. This can make hemorrhoids more likely. Also, don t sit on the toilet for long periods of time.  Follow-up care  Follow up with your healthcare provider as advised. If a culture or imaging tests were done, someone will let you know the results when they are ready. This may take a few days or longer. If your healthcare provider recommends a procedure for your hemorrhoids, these options can be discussed. Options may include surgery and outpatient office treatments.  When to seek medical advice  Call your healthcare provider right away if any of these occur:    Increased bleeding from the rectum    Increased pain around the rectum or anus    Weakness or dizziness  Call 911  Call 911 if any of these occur:    Trouble breathing or swallowing    Fainting or loss of consciousness    Unusually fast heart rate    Vomiting blood    Large amounts of blood in stool or black, tarry stools  Date Last Reviewed: 9/1/2017 2000-2019 [employer]. [address] [place] [address], [place] [address]. All rights reserved. This information is not intended as a substitute for professional medical care. Always follow your healthcare professional's instructions.

## 2020-09-29 NOTE — TELEPHONE ENCOUNTER
LVM for mother regarding rescheduling or converting to a video visit. Call back number provided.    Mailed letter.    Kati Cabello MA

## 2021-03-10 ENCOUNTER — OFFICE VISIT (OUTPATIENT)
Dept: FAMILY MEDICINE | Facility: CLINIC | Age: 41
End: 2021-03-10

## 2021-03-10 VITALS
HEIGHT: 69 IN | OXYGEN SATURATION: 98 % | HEART RATE: 74 BPM | SYSTOLIC BLOOD PRESSURE: 128 MMHG | BODY MASS INDEX: 43.4 KG/M2 | DIASTOLIC BLOOD PRESSURE: 84 MMHG | TEMPERATURE: 98.5 F | RESPIRATION RATE: 18 BRPM | WEIGHT: 293 LBS

## 2021-03-10 DIAGNOSIS — M62.830 BACK MUSCLE SPASM: Primary | ICD-10-CM

## 2021-03-10 PROCEDURE — 99213 OFFICE O/P EST LOW 20 MIN: CPT | Performed by: NURSE PRACTITIONER

## 2021-03-10 ASSESSMENT — MIFFLIN-ST. JEOR: SCORE: 2296.35

## 2021-03-10 NOTE — PATIENT INSTRUCTIONS
Naproxen 2 tabs twice daily for 5 days.  Can use tizanidine as needed for pain.  Ice/heat.  Gentle stretching.  Let me know if not improving.

## 2021-03-10 NOTE — PROGRESS NOTES
"    Assessment & Plan     Back muscle spasm  Conservative care, NSAIDs, stretching, strengthening advised. Can use tizanidine as needed.  - tiZANidine (ZANAFLEX) 4 MG tablet; Take 1 tablet (4 mg) by mouth 3 times daily         BMI:   Estimated body mass index is 51.69 kg/m  as calculated from the following:    Height as of this encounter: 1.74 m (5' 8.5\").    Weight as of this encounter: 156.5 kg (345 lb).       Patient Instructions   Naproxen 2 tabs twice daily for 5 days.  Can use tizanidine as needed for pain.  Ice/heat.  Gentle stretching.  Let me know if not improving.      Return in about 1 week (around 3/17/2021) for worsening or continued symptoms.    DIONNA Jones CNP  [facility]    Brenton Maher is a 40 year old who presents for the following health issues     HPI       Musculoskeletal problem/pain  Onset/Duration: x yesterday at 2pm - felt series of snaps ( like rubber bandssnapping)  Description  Location: shoulder blade on Left side  - left - radiates     -burning stabbing pain  Joint Swelling: no  Redness: no  Pain: YES  Warmth: unknown  Intensity:  severe  Progression of Symptoms:  worsening and constant - if she sits in one stop for 20 mins or so the pain will subside somewaht  Accompanying signs and symptoms:   Fevers: no  Numbness/tingling/weakness: no  History  Trauma to the area: no  Recent illness:  no  Previous similar problem: YES - 90% detachment of labrum   Previous evaluation:  no  Precipitating or alleviating factors:  Aggravating factors include: any movement   Therapies tried and outcome: heat, ice and massage    Above HPI reviewed. Additionally, no known injury. Hot bath was helpful last night. No neck pain, fevers, chills.          Review of Systems   Constitutional, HEENT, cardiovascular, pulmonary, gi and gu systems are negative, except as otherwise noted.      Objective    /84 (BP Location: Right arm, Patient Position: Sitting, " "Cuff Size: Adult Large)   Pulse 74   Temp 98.5  F (36.9  C) (Tympanic)   Resp 18   Ht 1.74 m (5' 8.5\")   Wt (!) 156.5 kg (345 lb)   LMP 02/19/2021 (Exact Date)   SpO2 98%   Breastfeeding No   BMI 51.69 kg/m    Body mass index is 51.69 kg/m .  Physical Exam  Vitals signs and nursing note reviewed.   Constitutional:       General: She is not in acute distress.     Appearance: Normal appearance.   HENT:      Head: Normocephalic and atraumatic.      Mouth/Throat:      Mouth: Mucous membranes are moist.   Neck:      Musculoskeletal: Neck supple.   Cardiovascular:      Rate and Rhythm: Normal rate.   Pulmonary:      Effort: Pulmonary effort is normal.   Musculoskeletal:      Comments: No TTP of paracervical musculature. Normal ROM of neck, somewhat increased pain with lateral rotation of the neck. No meningismus. TTP of left trapezius with associated spasm. Normal ROM of left shoulder. No TTP of AC, glenohumeral joints.   Skin:     General: Skin is warm and dry.   Neurological:      General: No focal deficit present.      Mental Status: She is alert.   Psychiatric:         Mood and Affect: Mood normal.         Behavior: Behavior normal.                        "

## 2021-05-17 DIAGNOSIS — R56.9 CONVULSIONS, UNSPECIFIED CONVULSION TYPE (H): ICD-10-CM

## 2021-05-19 ENCOUNTER — MYC MEDICAL ADVICE (OUTPATIENT)
Dept: FAMILY MEDICINE | Facility: CLINIC | Age: 41
End: 2021-05-19

## 2021-05-19 RX ORDER — CARBAMAZEPINE 200 MG/1
TABLET ORAL
Qty: 540 TABLET | Refills: 1 | Status: SHIPPED | OUTPATIENT
Start: 2021-05-19 | End: 2021-11-17

## 2021-07-23 ENCOUNTER — APPOINTMENT (OUTPATIENT)
Dept: CT IMAGING | Facility: HOSPITAL | Age: 41
End: 2021-07-23
Attending: EMERGENCY MEDICINE

## 2021-07-23 ENCOUNTER — HOSPITAL ENCOUNTER (EMERGENCY)
Facility: HOSPITAL | Age: 41
Discharge: HOME | End: 2021-07-23
Attending: EMERGENCY MEDICINE | Admitting: EMERGENCY MEDICINE

## 2021-07-23 VITALS
RESPIRATION RATE: 16 BRPM | DIASTOLIC BLOOD PRESSURE: 88 MMHG | WEIGHT: 292 LBS | HEART RATE: 65 BPM | BODY MASS INDEX: 43.75 KG/M2 | OXYGEN SATURATION: 99 % | TEMPERATURE: 99.7 F | SYSTOLIC BLOOD PRESSURE: 130 MMHG

## 2021-07-23 DIAGNOSIS — R10.33 ABDOMINAL PAIN, PERIUMBILICAL: ICD-10-CM

## 2021-07-23 LAB
ALBUMIN SERPL-MCNC: 3.7 G/DL (ref 3.5–5)
ALP SERPL-CCNC: 68 U/L (ref 45–120)
ALT SERPL W P-5'-P-CCNC: 20 U/L (ref 0–45)
ANION GAP SERPL CALCULATED.3IONS-SCNC: 7 MMOL/L (ref 5–18)
AST SERPL W P-5'-P-CCNC: 21 U/L (ref 0–40)
BASOPHILS # BLD AUTO: 0.1 10E3/UL (ref 0–0.2)
BASOPHILS NFR BLD AUTO: 1 %
BILIRUB DIRECT SERPL-MCNC: 0.2 MG/DL
BILIRUB SERPL-MCNC: 0.6 MG/DL (ref 0–1)
BUN SERPL-MCNC: 12 MG/DL (ref 8–22)
CALCIUM SERPL-MCNC: 8.9 MG/DL (ref 8.5–10.5)
CHLORIDE BLD-SCNC: 108 MMOL/L (ref 98–107)
CO2 SERPL-SCNC: 24 MMOL/L (ref 22–31)
CREAT SERPL-MCNC: 0.76 MG/DL (ref 0.6–1.1)
EOSINOPHIL # BLD AUTO: 0.2 10E3/UL (ref 0–0.7)
EOSINOPHIL NFR BLD AUTO: 2 %
ERYTHROCYTE [DISTWIDTH] IN BLOOD BY AUTOMATED COUNT: 13.1 % (ref 10–15)
GFR SERPL CREATININE-BSD FRML MDRD: >90 ML/MIN/1.73M2
GLUCOSE BLD-MCNC: 89 MG/DL (ref 70–125)
HCG SERPL QL: NEGATIVE
HCT VFR BLD AUTO: 39.5 % (ref 35–47)
HGB BLD-MCNC: 13.1 G/DL (ref 11.7–15.7)
HOLD SPECIMEN: NORMAL
IMM GRANULOCYTES # BLD: 0 10E3/UL
IMM GRANULOCYTES NFR BLD: 0 %
LIPASE SERPL-CCNC: 13 U/L (ref 0–52)
LYMPHOCYTES # BLD AUTO: 2.1 10E3/UL (ref 0.8–5.3)
LYMPHOCYTES NFR BLD AUTO: 29 %
MCH RBC QN AUTO: 31.8 PG (ref 26.5–33)
MCHC RBC AUTO-ENTMCNC: 33.2 G/DL (ref 31.5–36.5)
MCV RBC AUTO: 96 FL (ref 78–100)
MONOCYTES # BLD AUTO: 0.6 10E3/UL (ref 0–1.3)
MONOCYTES NFR BLD AUTO: 8 %
NEUTROPHILS # BLD AUTO: 4.5 10E3/UL (ref 1.6–8.3)
NEUTROPHILS NFR BLD AUTO: 60 %
NRBC # BLD AUTO: 0 10E3/UL
NRBC BLD AUTO-RTO: 0 /100
PLATELET # BLD AUTO: 265 10E3/UL (ref 150–450)
POTASSIUM BLD-SCNC: 3.8 MMOL/L (ref 3.5–5)
PROT SERPL-MCNC: 6.8 G/DL (ref 6–8)
RBC # BLD AUTO: 4.12 10E6/UL (ref 3.8–5.2)
SODIUM SERPL-SCNC: 139 MMOL/L (ref 136–145)
WBC # BLD AUTO: 7.4 10E3/UL (ref 4–11)

## 2021-07-23 PROCEDURE — 36415 COLL VENOUS BLD VENIPUNCTURE: CPT | Performed by: STUDENT IN AN ORGANIZED HEALTH CARE EDUCATION/TRAINING PROGRAM

## 2021-07-23 PROCEDURE — 84703 CHORIONIC GONADOTROPIN ASSAY: CPT | Performed by: STUDENT IN AN ORGANIZED HEALTH CARE EDUCATION/TRAINING PROGRAM

## 2021-07-23 PROCEDURE — 74177 CT ABD & PELVIS W/CONTRAST: CPT

## 2021-07-23 PROCEDURE — 85025 COMPLETE CBC W/AUTO DIFF WBC: CPT | Performed by: EMERGENCY MEDICINE

## 2021-07-23 PROCEDURE — 83690 ASSAY OF LIPASE: CPT | Performed by: STUDENT IN AN ORGANIZED HEALTH CARE EDUCATION/TRAINING PROGRAM

## 2021-07-23 PROCEDURE — 250N000011 HC RX IP 250 OP 636: Performed by: EMERGENCY MEDICINE

## 2021-07-23 PROCEDURE — 99285 EMERGENCY DEPT VISIT HI MDM: CPT | Mod: 25

## 2021-07-23 PROCEDURE — 80053 COMPREHEN METABOLIC PANEL: CPT | Performed by: STUDENT IN AN ORGANIZED HEALTH CARE EDUCATION/TRAINING PROGRAM

## 2021-07-23 RX ORDER — IOPAMIDOL 755 MG/ML
100 INJECTION, SOLUTION INTRAVASCULAR ONCE
Status: COMPLETED | OUTPATIENT
Start: 2021-07-23 | End: 2021-07-23

## 2021-07-23 RX ADMIN — IOPAMIDOL 100 ML: 755 INJECTION, SOLUTION INTRAVENOUS at 19:57

## 2021-07-23 NOTE — ED PROVIDER NOTES
Emergency Department Encounter     Evaluation Date & Time:   No admission date for patient encounter.    CHIEF COMPLAINT:  Abdominal Pain      Triage Note:Umbilical pain for a week, went to  referred here for US.  No testing done at         Impression and Plan       FINAL IMPRESSION:    ICD-10-CM    1. Abdominal pain, periumbilical  R10.33          ED COURSE & MEDICAL DECISION MAKIN:12 PM Met with patient for initial interview and exam. Discussed initial plan for care for their stay in the emergency department.    41 year old female, history of anxiety and obesity, who presents for evaluation of abdominal pain. She reports intermittent, mild cramping pain for the past 3-4 weeks, which has become very severe over the past week. The pain has been periumbilical in location, but today has been radiating to the sides of her abdomen. She has associated abdominal distention / bloating, nausea and lightheadedness. No vomiting, diarrhea, constipation, urinary symptoms or fevers.     On exam, abdomen is soft with mild tenderness to palpation diffusely; no peritoneal signs or CVAT, BL.    IV access established and blood sent for labs.    Pregnancy test negative.  UA ordered and sent, however I do not see it resulted.    Labs otherwise remarkable for no leukocytosis, anemia, significant electrolyte derangements or renal impairment.  No laboratory evidence of hepatitis, biliary obstruction or pancreatitis.    CT abdomen / pelvis performed and demonstrated no acute abnormality in the abdomen or pelvis.    Patient discharged to home with follow-up PMD; consider colonoscopy as next step in evaluation if symptoms continue. Return instructions provided.  Patient stable throughout ED course.      At the conclusion of the encounter I discussed the results of all the tests and the disposition. The questions were answered. The patient and family acknowledged understanding and was agreeable with the care  plan.      MEDICATIONS GIVEN IN THE EMERGENCY DEPARTMENT:  Medications   iopamidol (ISOVUE-370) solution 100 mL (100 mLs Intravenous Given 21)       NEW PRESCRIPTIONS STARTED AT TODAY'S ED VISIT:  Discharge Medication List as of 2021  8:39 PM          JAMIE Cheney is a 41 year old female with a pertinent history of anxiety and obesity, who presents to this ED via walk in for evaluation of abdominal pain.    Patient reports 3-4 weeks of intermittent, mild cramping abdominal pain, but over the last week the episodes of pain have been severe doubling her over. The pain is periumbilical in location, however today the pain has been radiating to the sides of her abdomen. The pain is burning and shooting in nature without provocative features. She reports associated abdominal distention and sensation of bloating.  She has had nausea without any vomiting.  She also reports some lightheadedness.  She denies associated diarrhea, constipation and urinary symptoms.  No fevers.      She has otherwise been in her usual state of health and denies chest pain, shortness of breath, cough or other concerns.      LNMP a few weeks ago.     REVIEW OF SYSTEMS:  All other systems reviewed and are negative.      Medical History     Past Medical History:   Diagnosis Date     ASCUS of cervix with negative high risk HPV 2017     Convulsion (H)      Urinary tract infection, site not specified        Past Surgical History:   Procedure Laterality Date     C/SECTION, CLASSICAL  2006    , Classical     C/SECTION, CLASSICAL      , Classical     TONSILLECTOMY & ADENOIDECTOMY  1991     TUBAL LIGATION  2006       Family History   Problem Relation Age of Onset     Hypertension Father      Depression Father      Alcohol/Drug Father      Hypertension Paternal Grandmother      Depression Paternal Grandmother      Alcohol/Drug Paternal Grandmother      Psychotic Disorder  Paternal Grandmother      Hypertension Paternal Grandfather      Depression Paternal Grandfather      Cancer Paternal Grandfather         cancer around his bile duct.     Alcohol/Drug Mother      Depression Mother      Scoliosis Mother      Alcohol/Drug Brother      Depression Brother      Psychotic Disorder Brother      Asthma No family hx of      C.A.D. No family hx of      Diabetes No family hx of      Cerebrovascular Disease No family hx of      Breast Cancer No family hx of      Cancer - colorectal No family hx of      Prostate Cancer No family hx of        Social History     Tobacco Use     Smoking status: Former Smoker     Packs/day: 0.10     Years: 7.00     Pack years: 0.70     Types: Cigarettes     Start date: 4/22/2015     Smokeless tobacco: Never Used     Tobacco comment: 3-4 ciggs per day   Substance Use Topics     Alcohol use: Yes     Comment: occ     Drug use: No       carBAMazepine (TEGRETOL) 200 MG tablet  tiZANidine (ZANAFLEX) 4 MG tablet        Physical Exam     First Vitals:  Patient Vitals for the past 24 hrs:   BP Temp Temp src Pulse Resp SpO2 Weight   07/23/21 2031 130/88 -- -- 65 -- 99 % --   07/23/21 2014 (!) 145/81 -- -- 77 -- 99 % --   07/23/21 1609 (!) 174/105 99.7  F (37.6  C) Temporal 82 16 100 % 132.5 kg (292 lb)       PHYSICAL EXAM:   Physical Exam    GENERAL: Awake, alert.  In no acute distress.   HEENT: Normocephalic, atraumatic. Pupils equal, round and reactive. Conjunctiva normal.  NECK: No stridor.  PULMONARY: Symmetrical breath sounds without distress.  Lungs clear to auscultation bilaterally without wheezes, rhonchi or rales.  CARDIO: Regular rate and rhythm.  No significant murmur, rub or gallop.  Radial pulses strong and symmetrical.  ABDOMINAL: Abdomen soft, non-distended with mild tenderness to palpation diffusely; no rebound tenderness or guarding.  No CVAT, BL.  EXTREMITIES: No lower extremity swelling or edema.      NEURO: Alert and oriented to person, place and time.   Cranial nerves grossly intact.  No focal motor deficit.  PSYCH: Normal mood and affect.  SKIN: No rashes.     Results     LAB:  All pertinent labs reviewed and interpreted  Labs Ordered and Resulted from Time of ED Arrival Up to the Time of Departure from the ED   BASIC METABOLIC PANEL - Abnormal; Notable for the following components:       Result Value    Chloride 108 (*)     All other components within normal limits   HEPATIC FUNCTION PANEL - Normal   LIPASE - Normal   HCG QUALITATIVE PREGNANCY - Normal   EXTRA RED TOP TUBE   EXTRA GREEN TOP (LITHIUM HEPARIN) TUBE   EXTRA PURPLE TOP TUBE   CBC WITH PLATELETS AND DIFFERENTIAL   URINE MACROSCOPIC WITH REFLEX TO MICRO   PERIPHERAL IV CATHETER   EXTRA TUBE    Narrative:     The following orders were created for panel order [place] Draw.  Procedure                               Abnormality         Status                     ---------                               -----------         ------                     Extra Red Top Tube[[id number]]                               Final result               Extra Green Top (Lithium...[[id number]]                      Final result               Extra Purple Top Tube[[id number]]                            Final result                 Please view results for these tests on the individual orders.   CBC WITH PLATELETS & DIFFERENTIAL    Narrative:     The following orders were created for panel order CBC with Platelets & Differential.  Procedure                               Abnormality         Status                     ---------                               -----------         ------                     CBC with platelets and d...[[id number]]                      Final result                 Please view results for these tests on the individual orders.       RADIOLOGY:  Abd/pelvis CT,  IV  contrast only TRAUMA / AAA   Final Result   IMPRESSION:    1.  No acute abnormality in the abdomen or pelvis.          [facility]  Documentation         I, Joyce Mix, am serving as a scribe to document services personally performed by Nevin Nunez MD based on my observation and the provider's statements to me. I, Nevin Nunez MD attest that Joyce Mix is acting in a scribe capacity, has observed my performance of the services and has documented them in accordance with my direction.    Nevin Nunez MD  Emergency Medicine  [facility] EMERGENCY DEPARTMENT         Neivn Nunez MD  07/24/21 4676

## 2021-07-24 NOTE — DISCHARGE INSTRUCTIONS
Please follow-up with your Primary Care Provider this upcoming week for a recheck; call to arrange appointment.    Return to the ER for worsening symptoms, worsening pain, persistent nausea / vomiting, fever or other concerns.

## 2021-11-15 DIAGNOSIS — R56.9 CONVULSIONS, UNSPECIFIED CONVULSION TYPE (H): ICD-10-CM

## 2021-11-17 RX ORDER — CARBAMAZEPINE 200 MG/1
TABLET ORAL
Qty: 540 TABLET | Refills: 0 | OUTPATIENT
Start: 2021-11-17

## 2022-09-12 ENCOUNTER — APPOINTMENT (OUTPATIENT)
Dept: CT IMAGING | Facility: CLINIC | Age: 42
End: 2022-09-12
Attending: EMERGENCY MEDICINE

## 2022-09-12 ENCOUNTER — HOSPITAL ENCOUNTER (EMERGENCY)
Facility: CLINIC | Age: 42
Discharge: HOME | End: 2022-09-13
Attending: EMERGENCY MEDICINE | Admitting: EMERGENCY MEDICINE

## 2022-09-12 VITALS
BODY MASS INDEX: 34.36 KG/M2 | HEIGHT: 70 IN | OXYGEN SATURATION: 98 % | RESPIRATION RATE: 16 BRPM | SYSTOLIC BLOOD PRESSURE: 130 MMHG | DIASTOLIC BLOOD PRESSURE: 85 MMHG | WEIGHT: 240 LBS | HEART RATE: 90 BPM | TEMPERATURE: 98.1 F

## 2022-09-12 DIAGNOSIS — K42.9 PERIUMBILICAL HERNIA: ICD-10-CM

## 2022-09-12 LAB
ALBUMIN SERPL BCG-MCNC: 4 G/DL (ref 3.5–5.2)
ALP SERPL-CCNC: 62 U/L (ref 35–104)
ALT SERPL W P-5'-P-CCNC: 28 U/L (ref 10–35)
ANION GAP SERPL CALCULATED.3IONS-SCNC: 9 MMOL/L (ref 7–15)
AST SERPL W P-5'-P-CCNC: 29 U/L (ref 10–35)
BASOPHILS # BLD AUTO: 0.1 10E3/UL (ref 0–0.2)
BASOPHILS NFR BLD AUTO: 1 %
BILIRUB SERPL-MCNC: 0.3 MG/DL
BUN SERPL-MCNC: 12 MG/DL (ref 6–20)
CALCIUM SERPL-MCNC: 8.9 MG/DL (ref 8.6–10)
CHLORIDE SERPL-SCNC: 104 MMOL/L (ref 98–107)
CREAT SERPL-MCNC: 0.65 MG/DL (ref 0.51–0.95)
DEPRECATED HCO3 PLAS-SCNC: 25 MMOL/L (ref 22–29)
EOSINOPHIL # BLD AUTO: 0.3 10E3/UL (ref 0–0.7)
EOSINOPHIL NFR BLD AUTO: 3 %
ERYTHROCYTE [DISTWIDTH] IN BLOOD BY AUTOMATED COUNT: 12.9 % (ref 10–15)
GFR SERPL CREATININE-BSD FRML MDRD: >90 ML/MIN/1.73M2
GLUCOSE SERPL-MCNC: 87 MG/DL (ref 70–99)
HCT VFR BLD AUTO: 40.3 % (ref 35–47)
HGB BLD-MCNC: 13.6 G/DL (ref 11.7–15.7)
IMM GRANULOCYTES # BLD: 0 10E3/UL
IMM GRANULOCYTES NFR BLD: 0 %
LACTATE SERPL-SCNC: 0.6 MMOL/L (ref 0.7–2)
LYMPHOCYTES # BLD AUTO: 2.4 10E3/UL (ref 0.8–5.3)
LYMPHOCYTES NFR BLD AUTO: 26 %
MCH RBC QN AUTO: 32.5 PG (ref 26.5–33)
MCHC RBC AUTO-ENTMCNC: 33.7 G/DL (ref 31.5–36.5)
MCV RBC AUTO: 96 FL (ref 78–100)
MONOCYTES # BLD AUTO: 0.6 10E3/UL (ref 0–1.3)
MONOCYTES NFR BLD AUTO: 7 %
NEUTROPHILS # BLD AUTO: 5.7 10E3/UL (ref 1.6–8.3)
NEUTROPHILS NFR BLD AUTO: 63 %
NRBC # BLD AUTO: 0 10E3/UL
NRBC BLD AUTO-RTO: 0 /100
PLATELET # BLD AUTO: 257 10E3/UL (ref 150–450)
POTASSIUM SERPL-SCNC: 3.6 MMOL/L (ref 3.4–5.3)
PROT SERPL-MCNC: 6.7 G/DL (ref 6.4–8.3)
RBC # BLD AUTO: 4.19 10E6/UL (ref 3.8–5.2)
SODIUM SERPL-SCNC: 138 MMOL/L (ref 136–145)
WBC # BLD AUTO: 9.1 10E3/UL (ref 4–11)

## 2022-09-12 PROCEDURE — 99284 EMERGENCY DEPT VISIT MOD MDM: CPT | Performed by: EMERGENCY MEDICINE

## 2022-09-12 PROCEDURE — 250N000011 HC RX IP 250 OP 636: Performed by: EMERGENCY MEDICINE

## 2022-09-12 PROCEDURE — 85025 COMPLETE CBC W/AUTO DIFF WBC: CPT | Performed by: EMERGENCY MEDICINE

## 2022-09-12 PROCEDURE — 74177 CT ABD & PELVIS W/CONTRAST: CPT

## 2022-09-12 PROCEDURE — 80053 COMPREHEN METABOLIC PANEL: CPT | Performed by: EMERGENCY MEDICINE

## 2022-09-12 PROCEDURE — 36415 COLL VENOUS BLD VENIPUNCTURE: CPT | Performed by: EMERGENCY MEDICINE

## 2022-09-12 PROCEDURE — 99285 EMERGENCY DEPT VISIT HI MDM: CPT | Mod: 25 | Performed by: EMERGENCY MEDICINE

## 2022-09-12 PROCEDURE — 83605 ASSAY OF LACTIC ACID: CPT | Performed by: EMERGENCY MEDICINE

## 2022-09-12 PROCEDURE — 250N000009 HC RX 250: Performed by: EMERGENCY MEDICINE

## 2022-09-12 RX ORDER — IOPAMIDOL 755 MG/ML
100 INJECTION, SOLUTION INTRAVASCULAR ONCE
Status: COMPLETED | OUTPATIENT
Start: 2022-09-12 | End: 2022-09-12

## 2022-09-12 RX ADMIN — IOPAMIDOL 100 ML: 755 INJECTION, SOLUTION INTRAVENOUS at 23:46

## 2022-09-12 RX ADMIN — SODIUM CHLORIDE 69 ML: 9 INJECTION, SOLUTION INTRAVENOUS at 23:46

## 2022-09-12 ASSESSMENT — ENCOUNTER SYMPTOMS
VOMITING: 0
WOUND: 0
CHEST TIGHTNESS: 0
FATIGUE: 0
SHORTNESS OF BREATH: 0
APPETITE CHANGE: 0
COUGH: 0
BACK PAIN: 0
CHILLS: 0
NAUSEA: 0
CONSTIPATION: 1
DIARRHEA: 0
FEVER: 0
ABDOMINAL PAIN: 1
HEADACHES: 0

## 2022-09-12 ASSESSMENT — ACTIVITIES OF DAILY LIVING (ADL): ADLS_ACUITY_SCORE: 35

## 2022-09-13 ENCOUNTER — NURSE TRIAGE (OUTPATIENT)
Dept: NURSING | Facility: CLINIC | Age: 42
End: 2022-09-13

## 2022-09-13 NOTE — ED TRIAGE NOTES
Concerned about hernia pain, hernia has been there for a while, but tonight the pain is much worse     Triage Assessment     Row Name 09/12/22 2113       Triage Assessment (Adult)    Airway WDL WDL       Cardiac WDL    Cardiac WDL WDL       Cognitive/Neuro/Behavioral WDL    Cognitive/Neuro/Behavioral WDL WDL
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
## 2022-09-14 ENCOUNTER — TELEPHONE (OUTPATIENT)
Dept: SURGERY | Facility: CLINIC | Age: 42
End: 2022-09-14

## 2022-09-14 NOTE — TELEPHONE ENCOUNTER
Triage Call:    Patient calling to report onset of hernia pain 2 hours ago.  She reports the abdominal hernia is hard, protruding, and tender.  She reports 5/10 pain.  Patient is currently laying down on bed nervous to get up.      According to the protocol, patient should go to ED now.  Care advice given to remain NPO.  Patient verbalizes understanding and agrees with plan of care. Patient plans to go to [place] ED.    Gely Canchola RN  09/13/22 9:06 PM  [facility] Nurse Advisor     Reason for Disposition    SEVERE abdominal pain    Additional Information    Negative: [1] Swelling of scrotum AND [2] has not previously been diagnosed with a hernia    Protocols used: HERNIA-A-

## 2022-09-14 NOTE — TELEPHONE ENCOUNTER
Reason for Call:  Same Day Appointment, Requested Provider:  [facility]     PCP: No Ref-Primary, Physician    Reason for visit: Periumbilical hernia    Additional comments: Scheduled for Monday 9/19 but is hoping to be seen sooner    Can we leave a detailed message on this number? YES    Phone number patient can be reached at: Home number on file [phone] (home)    Best Time: any    Call taken on 9/14/2022 at 9:28 AM by Latricia Uriarte

## 2022-09-19 ENCOUNTER — OFFICE VISIT (OUTPATIENT)
Dept: SURGERY | Facility: CLINIC | Age: 42
End: 2022-09-19

## 2022-09-19 VITALS
TEMPERATURE: 98.7 F | SYSTOLIC BLOOD PRESSURE: 150 MMHG | WEIGHT: 240 LBS | DIASTOLIC BLOOD PRESSURE: 80 MMHG | BODY MASS INDEX: 34.36 KG/M2 | HEART RATE: 77 BPM | HEIGHT: 70 IN

## 2022-09-19 DIAGNOSIS — K42.0 UMBILICAL HERNIA, INCARCERATED: Primary | ICD-10-CM

## 2022-09-19 PROCEDURE — 99203 OFFICE O/P NEW LOW 30 MIN: CPT | Performed by: SURGERY

## 2022-09-19 RX ORDER — HEPARIN SODIUM 5000 [USP'U]/.5ML
5000 INJECTION, SOLUTION INTRAVENOUS; SUBCUTANEOUS
Status: CANCELLED | OUTPATIENT
Start: 2022-09-19

## 2022-09-19 RX ORDER — CEFAZOLIN SODIUM 2 G/100ML
2 INJECTION, SOLUTION INTRAVENOUS SEE ADMIN INSTRUCTIONS
Status: CANCELLED | OUTPATIENT
Start: 2022-09-19

## 2022-09-19 RX ORDER — CEFAZOLIN SODIUM 2 G/100ML
2 INJECTION, SOLUTION INTRAVENOUS
Status: CANCELLED | OUTPATIENT
Start: 2022-09-19

## 2022-09-19 NOTE — PATIENT INSTRUCTIONS
Per physician instructions      If you have questions or concerns on any instructions given to you by your provider today or if you need to schedule an appointment, you can reach us at [phone].     EXAM: CT ABDOMEN PELVIS W CONTRAST  LOCATION: [facility]  DATE/TIME: 9/12/2022 11:53 PM     INDICATION: Known umbilical hernia, 6 hours of intense periumbilical abdominal pain.  COMPARISON: 7/23/2021  TECHNIQUE: CT scan of the abdomen and pelvis was performed following injection of IV contrast. Multiplanar reformats were obtained. Dose reduction techniques were used.  CONTRAST: 100 mL Isovue 370     FINDINGS:   LOWER CHEST: Normal.     HEPATOBILIARY: Normal.     PANCREAS: Normal.     SPLEEN: Normal.     ADRENAL GLANDS: Normal.     KIDNEYS/BLADDER: Symmetric renal enhancement. No urinary collecting system dilatation or significant calculi. Bladder unremarkable.     BOWEL: No obstruction or inflammatory change. Appendix unremarkable.     LYMPH NODES: No lymphadenopathy.     VASCULATURE: Unremarkable.     PELVIC ORGANS: Uterus and both adnexa unremarkable. No significant free fluid.     MUSCULOSKELETAL: Interval increase in size of the myofascial defect at the umbilicus now measuring 1.7 x 1.1 cm. Interval increase in the amount of fat herniated into the umbilicus with slightly greater edema involving the fat with minimal adjacent   fluid. No evidence for bowel herniation. Marked degenerative disc disease L5-S1. Minimal degenerative disc changes lower thoracic spine. Degenerative changes in the lower lumbar facets.                                                                      IMPRESSION:   1.  Interval increase in size of a myofascial defect at the level of the umbilicus measuring 1.7 x 1.1 cm. The umbilicus now has increased amount of herniated fat with slightly greater edema in the fat. This could be causing the patient's central   abdominal pain.     2.  No evidence for bowel  herniation into this ventral hernia. No other ventral, inguinal or obturator hernias.     3.  No bowel dilatation or overt inflammatory change.     4.  Degenerative changes in the spine.       Your colons job is to absorb the liquid in your stool.  As we get older the colon or other medications can slow down the colon and allow the stool to get to firm.  Fiber mixes with your stool and does not allow all the water to get absorbed by the colon.  This will not give you diarrhea in fact I use it for people with diarrhea since it causes the stool to bulk up more and is easier to control.    Below are several fiber options.    For your constipation try using Metamucil or it's generic equivalent 1-2 tablespoons with a large glass of water or juice every day.      You can also use flax seed that is easily obtained at your grocery store. Make sure it is ground up and sprinkle 2 tablespoons on your cereal each morning and drink a large glass of water with it.  You can also mix in yogurt, and even bake in bread or muffins.    Flax seed seems to give less gas and does not have any taste.   If these are not working for you I would be glad to see you back in my clinic to discuss other options.  Call [phone]: to set up an appointment.        Assessment: umbilical hernia and was able to reduce it.    Plan to do open umbilical hernia repair with mesh.   Discussed robotic but there are risks of hernia from the trocar sites and this is just a little bigger then my finger.      Risks of surgery include damage to nerves, bleeding, infection, damage to  Vessels, recurrence.  Although mesh is a better long term repair if it gets infected it must be removed.   If the patient has any bacterial infection the week before and is seen by their doctor and started on antibiotics, I can probably still do the surgery if they are vastly improved by the time of surgery, but if the infection starts closer to the surgery date it will be better  to cancel and reschedule to a later date.  A cough will also be hard on the repair and uncomfortable post operative.  If the patient is a smoker I did discuss increase risk of recurrence and more pain with the cough.  If the patient is willing to quit smoking would encourage to do so and start at least a week before surgery.  However, if patient is not going to quit then must understand that his repair is more likely to fail.    Risks of surgery discussed including, but not limited to bleeding, infection, recurrence, damage to nerves and what is in the hernia sac.  Risks of anesthesia also discussed.    Discussed massaging hernia back in and using ice if becomes more painful.  If not able to reduce then go to emergency room.  Also discussed hernia belt to use until able to get in for surgery.    Things you will need to do before surgery are getting a preoperative appointment with your primary care doctor to be cleared for surgery.    You will also need a COVID test before surgery and an appointment to see me usually about 2 weeks after the procedure to make sure you are doing well.   Fortunately, when the schedulers call you they will try to get all this set up for you at that one call.     If you have had a positive COVID test in a 90 day window that would include the date of the procedure, let us know that and will need to see proof of this.    Then you do not need a COVID test.  But if over 90 days you do.      HERNIORRHAPHY DISCHARGE INSTRUCTIONS  DR. SON GONSALEZ    Please call [phone], for  [facility] or  for [facility], to schedule a follow up appointment in 2 weeks.       1. You may resume your regular diet when you feel you are ready to. DO NOT drink alcoholic beverages for 24 hours or while you are taking prescription medication.    2. Limit your activities for the first 48 hours. Gradually, increase them as tolerated. You may use stairs. I encourage you to  walk as tolerated. No lifting greater that 20 pounds for 3 weeks.    3. You will have some discomfort at the incision sites. This is expected. This should improve over the next 2-3 days. Ice and pain medication will help with this pain. Use prescribed pain medication as instructed.    4. Bruising and mild swelling is normal after surgery. For males it is common to have bruising going into the penis and scrotum. The area below and around the incision(s) will be hard and elevated. This is normal. I call it the healing ridge. This will resolve slowly over the next several months. If you feel the pain is increasing and cannot explain it by increasing activity please call us at [phone].    5. The dressing will often have some blood on it. You may shower 24 hours after surgery. No baths for 2 weeks after surgery. Clean gently over incision site. If clear plastic covering or steri-strip comes off and there is still some bleeding or drainage then cover with gauze or band-aid. If no bleeding, there is no reason to cover site. The abdominal binder may be removed after 24 hours after surgery. You may continue to wear it however for comfort. I suggest  you wear an old t-shirt under the abdominal binder for a more comfortable wear.    6. Avoid Aspirin for the first 72 hours after the procedure. This medication may increase the tendency to bleed.    7. Use the following medications (in addition to your normal meds) as shown:  a. Percocet 5 mg 1-2 every 6 hours as needed for severe pain. This contains 325 mg of Tylenol (acetaminophen) per tablet.  Please do not take more than 4 grams of Tylenol (acetaminophen) per day. For example, you may take 1 Percocet and 1 Tylenol, or 2 Percocet and no Tylenol, or 2 Tylenol and no Percocet every 6 hours.  b. Tylenol (acetaminophen) 500 mg every 6 hours as needed for mild pain. Do not take more than 1000 mg every 6 hours. (see above).  c. Motrin (ibuprofen) 200-800 mg every 6 hours as  needed for mild to moderate pain. Take with food.     8. Notify Dr. Barrios's clinic at [phone], for  [facility] or  for [facility], to schedule a follow up appointment in 2 weeks.   if:  Your discomfort is not relieved by your pain medication.  You have signs of infection such as temperature above 100.5 degrees orally, chills, or increasing daily discomfort.  Incision site is becoming more red and/or there is purulent drainage.  You have questions or concerns.    9. Please call [phone], for  [facility] or  for [facility], to schedule a follow up appointment in 2 weeks.   to schedule a follow up appointment in about 2 weeks.    10. When taking narcotics (pain medication more than Tylenol [acetaminophen] and Motrin [ibuprofen]) it is important to keep your stools soft to avoid constipation and pain with straining. This is best done by drinking fluids (non-alcoholic and non-caffeinated) and taking a stool softener (i.e. Metamucil or milk of magnesia). You may be able to use non-narcotics for pain relief especially by the 3rd post- operative day. Tylenol (acetaminophen) 500 mg every 6 hours and/or Motrin (ibuprofen) 200-800 mg every 6 hours. Please do not take more than 4 grams of Tylenol (acetaminophen) per day. Remember your Percocet does have Tylenol (acetaminophen) already in it. Please take Motrin (ibuprofen) with food to help protect the stomach. If you have a history of stomach ulcers or stomach problems, do not take Motrin (ibuprofen).     11. Do not drive or operate heavy machinery for 24 hours after surgery or when taking narcotics. You may resume driving when feel that you can safely avoid an accident and are not taking narcotics. This is usually 5 to 7 days after surgery. You should not be alone for 24 hours after surgery.    12. Have milk of magnesia available at home so that when you take the pain medications you  take 1-2 tablepoons a day, to help reduce problems with constipation.      13. If you have questions after your procedure/surgery please contact Dr Barrios's primary clinic in [place]. You can call [phone], your other option is to send us a [employer] message through your [employer] portal to Dr Barrios's team. For urgent and non urgent matters these options are best. If your symptoms are emergent or cannot wait, please proceed to [facility] and let them know who you had surgery with.

## 2022-09-19 NOTE — LETTER
2022         RE: Gunnar Cheney  [address]  [place] [address]        Dear Colleague,    Thank you for referring your patient, Gunnar Cheney, to the [facility]. Please see a copy of my visit note below.    Dear  Emergency room doc   I have seen Gunnar Cheney and as you know his chief complaint is periumbilical pain.     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  This started about a year ago.  Has been achy in the umbilicus.   Stated on a Thursday and then went in the following Monday came in.      HPI:  Patient is a 42 year old female  with complaints see above  Patient has not family history of hernia problems  nothing makes the episode better.    Smokes organic tobacco.   Review Of Systems  Respiratory: No shortness of breath, dyspnea on exertion, cough, or hemoptysis  Cardiovascular: negative  Gastrointestinal: nausea, vomiting and constipation  All new with the hernia.    Endocrine: negative  :  negative    10 Point review of systems all others are negative.   There were no vitals taken for this visit.    Past Medical History:   Diagnosis Date     ASCUS of cervix with negative high risk HPV 2017     Convulsion (H)      Urinary tract infection, site not specified        Past Surgical History:   Procedure Laterality Date     C/SECTION, CLASSICAL  2006    , Classical     C/SECTION, CLASSICAL      , Classical     TONSILLECTOMY & ADENOIDECTOMY  1991     TUBAL LIGATION  2006       Social History     Socioeconomic History     Marital status:      Spouse name: Not on file     Number of children: 3     Years of education: 14     Highest education level: Not on file   Occupational History     Occupation:      Comment: [employer]   Tobacco Use     Smoking status: Former Smoker     Packs/day: 0.10     Years: 7.00     Pack years: 0.70     Types: Cigarettes     Start date:  4/22/2015     Smokeless tobacco: Never Used     Tobacco comment: 3-4 ciggs per day   Substance and Sexual Activity     Alcohol use: Yes     Comment: occ     Drug use: No     Sexual activity: Yes     Partners: Male     Birth control/protection: Female Surgical     Comment: Tubal ligation.   Other Topics Concern     Parent/sibling w/ CABG, MI or angioplasty before 65F 55M? No   Social History Narrative     Not on file     Social Determinants of Health     Financial Resource Strain: Not on file   Food Insecurity: Not on file   Transportation Needs: Not on file   Physical Activity: Not on file   Stress: Not on file   Social Connections: Not on file   Intimate Partner Violence: Not on file   Housing Stability: Not on file       Current Outpatient Medications   Medication Sig Dispense Refill     carBAMazepine (EPITOL) 200 MG tablet TAKE 3 TABLETS BY MOUTH TWICE DAILY 540 tablet 1       Above was reviewed  Physical exam: There were no vitals taken for this visit.   Patient able to get up on table without difficulty.   Patient is alert and orientated.   Head eyes, nose and mouth within normal limits.    Abdomen is abdomen is soft without significant tenderness, masses, organomegaly or guarding  bowel sounds are positive and no caput medusa noted.  Has an umbilical  hernias noted.  Abdomen reduced it.       Skin was warm and pink  Normal Affect      Lower extremity edema is not present.    Narrative & Impression   EXAM: CT ABDOMEN PELVIS W CONTRAST  LOCATION: [facility]  DATE/TIME: 9/12/2022 11:53 PM     INDICATION: Known umbilical hernia, 6 hours of intense periumbilical abdominal pain.  COMPARISON: 7/23/2021  TECHNIQUE: CT scan of the abdomen and pelvis was performed following injection of IV contrast. Multiplanar reformats were obtained. Dose reduction techniques were used.  CONTRAST: 100 mL Isovue 370     FINDINGS:   LOWER CHEST: Normal.     HEPATOBILIARY: Normal.     PANCREAS:  Normal.     SPLEEN: Normal.     ADRENAL GLANDS: Normal.     KIDNEYS/BLADDER: Symmetric renal enhancement. No urinary collecting system dilatation or significant calculi. Bladder unremarkable.     BOWEL: No obstruction or inflammatory change. Appendix unremarkable.     LYMPH NODES: No lymphadenopathy.     VASCULATURE: Unremarkable.     PELVIC ORGANS: Uterus and both adnexa unremarkable. No significant free fluid.     MUSCULOSKELETAL: Interval increase in size of the myofascial defect at the umbilicus now measuring 1.7 x 1.1 cm. Interval increase in the amount of fat herniated into the umbilicus with slightly greater edema involving the fat with minimal adjacent   fluid. No evidence for bowel herniation. Marked degenerative disc disease L5-S1. Minimal degenerative disc changes lower thoracic spine. Degenerative changes in the lower lumbar facets.                                                                      IMPRESSION:   1.  Interval increase in size of a myofascial defect at the level of the umbilicus measuring 1.7 x 1.1 cm. The umbilicus now has increased amount of herniated fat with slightly greater edema in the fat. This could be causing the patient's central   abdominal pain.     2.  No evidence for bowel herniation into this ventral hernia. No other ventral, inguinal or obturator hernias.     3.  No bowel dilatation or overt inflammatory change.     4.  Degenerative changes in the spine.       Your colons job is to absorb the liquid in your stool.  As we get older the colon or other medications can slow down the colon and allow the stool to get to firm.  Fiber mixes with your stool and does not allow all the water to get absorbed by the colon.  This will not give you diarrhea in fact I use it for people with diarrhea since it causes the stool to bulk up more and is easier to control.    Below are several fiber options.    For your constipation try using Metamucil or it's generic equivalent 1-2 tablespoons  with a large glass of water or juice every day.      You can also use flax seed that is easily obtained at your grocery store. Make sure it is ground up and sprinkle 2 tablespoons on your cereal each morning and drink a large glass of water with it.  You can also mix in yogurt, and even bake in bread or muffins.    Flax seed seems to give less gas and does not have any taste.   If these are not working for you I would be glad to see you back in my clinic to discuss other options.  Call [phone]: to set up an appointment.        Assessment: umbilical hernia and was able to reduce it.    Plan to do open umbilical hernia repair with mesh.   Discussed robotic but there are risks of hernia from the trocar sites and this is just a little bigger then my finger.      Risks of surgery include damage to nerves, bleeding, infection, damage to  Vessels, recurrence.  Although mesh is a better long term repair if it gets infected it must be removed.   If the patient has any bacterial infection the week before and is seen by their doctor and started on antibiotics, I can probably still do the surgery if they are vastly improved by the time of surgery, but if the infection starts closer to the surgery date it will be better to cancel and reschedule to a later date.  A cough will also be hard on the repair and uncomfortable post operative.  If the patient is a smoker I did discuss increase risk of recurrence and more pain with the cough.  If the patient is willing to quit smoking would encourage to do so and start at least a week before surgery.  However, if patient is not going to quit then must understand that his repair is more likely to fail.    Risks of surgery discussed including, but not limited to bleeding, infection, recurrence, damage to nerves and what is in the hernia sac.  Risks of anesthesia also discussed.    Discussed massaging hernia back in and using ice if becomes more painful.  If not able to reduce then  go to emergency room.  Also discussed hernia belt to use until able to get in for surgery.    Things you will need to do before surgery are getting a preoperative appointment with your primary care doctor to be cleared for surgery.    You will also need a COVID test before surgery and an appointment to see me usually about 2 weeks after the procedure to make sure you are doing well.   Fortunately, when the schedulers call you they will try to get all this set up for you at that one call.     If you have had a positive COVID test in a 90 day window that would include the date of the procedure, let us know that and will need to see proof of this.    Then you do not need a COVID test.  But if over 90 days you do.      Time spent with the patient with greater that 50% of the time in discussion was 30 minutes.  In discussing the plan and options .      Judson Barrios MD      Again, thank you for allowing me to participate in the care of your patient.        Sincerely,        Judson Barrios MD

## 2022-09-19 NOTE — PROGRESS NOTES
Dear  Emergency room doc   I have seen Gunnar Cheney and as you know his chief complaint is periumbilical pain.     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  This started about a year ago.  Has been achy in the umbilicus.   Stated on a Thursday and then went in the following Monday came in.      HPI:  Patient is a 42 year old female  with complaints see above  Patient has not family history of hernia problems  nothing makes the episode better.    Smokes organic tobacco.   Review Of Systems  Respiratory: No shortness of breath, dyspnea on exertion, cough, or hemoptysis  Cardiovascular: negative  Gastrointestinal: nausea, vomiting and constipation  All new with the hernia.    Endocrine: negative  :  negative    10 Point review of systems all others are negative.   There were no vitals taken for this visit.    Past Medical History:   Diagnosis Date     ASCUS of cervix with negative high risk HPV 2017     Convulsion (H)      Urinary tract infection, site not specified        Past Surgical History:   Procedure Laterality Date     C/SECTION, CLASSICAL  2006    , Classical     C/SECTION, CLASSICAL      , Classical     TONSILLECTOMY & ADENOIDECTOMY  1991     TUBAL LIGATION  2006       Social History     Socioeconomic History     Marital status:      Spouse name: Not on file     Number of children: 3     Years of education: 14     Highest education level: Not on file   Occupational History     Occupation:      Comment: [employer]   Tobacco Use     Smoking status: Former Smoker     Packs/day: 0.10     Years: 7.00     Pack years: 0.70     Types: Cigarettes     Start date: 2015     Smokeless tobacco: Never Used     Tobacco comment: 3-4 ciggs per day   Substance and Sexual Activity     Alcohol use: Yes     Comment: occ     Drug use: No     Sexual activity: Yes     Partners: Male     Birth control/protection: Female Surgical      Comment: Tubal ligation.   Other Topics Concern     Parent/sibling w/ CABG, MI or angioplasty before 65F 55M? No   Social History Narrative     Not on file     Social Determinants of Health     Financial Resource Strain: Not on file   Food Insecurity: Not on file   Transportation Needs: Not on file   Physical Activity: Not on file   Stress: Not on file   Social Connections: Not on file   Intimate Partner Violence: Not on file   Housing Stability: Not on file       Current Outpatient Medications   Medication Sig Dispense Refill     carBAMazepine (EPITOL) 200 MG tablet TAKE 3 TABLETS BY MOUTH TWICE DAILY 540 tablet 1       Above was reviewed  Physical exam: There were no vitals taken for this visit.   Patient able to get up on table without difficulty.   Patient is alert and orientated.   Head eyes, nose and mouth within normal limits.    Abdomen is abdomen is soft without significant tenderness, masses, organomegaly or guarding  bowel sounds are positive and no caput medusa noted.  Has an umbilical  hernias noted.  Abdomen reduced it.       Skin was warm and pink  Normal Affect      Lower extremity edema is not present.    Narrative & Impression   EXAM: CT ABDOMEN PELVIS W CONTRAST  LOCATION: [facility]  DATE/TIME: 9/12/2022 11:53 PM     INDICATION: Known umbilical hernia, 6 hours of intense periumbilical abdominal pain.  COMPARISON: 7/23/2021  TECHNIQUE: CT scan of the abdomen and pelvis was performed following injection of IV contrast. Multiplanar reformats were obtained. Dose reduction techniques were used.  CONTRAST: 100 mL Isovue 370     FINDINGS:   LOWER CHEST: Normal.     HEPATOBILIARY: Normal.     PANCREAS: Normal.     SPLEEN: Normal.     ADRENAL GLANDS: Normal.     KIDNEYS/BLADDER: Symmetric renal enhancement. No urinary collecting system dilatation or significant calculi. Bladder unremarkable.     BOWEL: No obstruction or inflammatory change. Appendix unremarkable.     LYMPH  NODES: No lymphadenopathy.     VASCULATURE: Unremarkable.     PELVIC ORGANS: Uterus and both adnexa unremarkable. No significant free fluid.     MUSCULOSKELETAL: Interval increase in size of the myofascial defect at the umbilicus now measuring 1.7 x 1.1 cm. Interval increase in the amount of fat herniated into the umbilicus with slightly greater edema involving the fat with minimal adjacent   fluid. No evidence for bowel herniation. Marked degenerative disc disease L5-S1. Minimal degenerative disc changes lower thoracic spine. Degenerative changes in the lower lumbar facets.                                                                      IMPRESSION:   1.  Interval increase in size of a myofascial defect at the level of the umbilicus measuring 1.7 x 1.1 cm. The umbilicus now has increased amount of herniated fat with slightly greater edema in the fat. This could be causing the patient's central   abdominal pain.     2.  No evidence for bowel herniation into this ventral hernia. No other ventral, inguinal or obturator hernias.     3.  No bowel dilatation or overt inflammatory change.     4.  Degenerative changes in the spine.       Your colons job is to absorb the liquid in your stool.  As we get older the colon or other medications can slow down the colon and allow the stool to get to firm.  Fiber mixes with your stool and does not allow all the water to get absorbed by the colon.  This will not give you diarrhea in fact I use it for people with diarrhea since it causes the stool to bulk up more and is easier to control.    Below are several fiber options.    For your constipation try using Metamucil or it's generic equivalent 1-2 tablespoons with a large glass of water or juice every day.      You can also use flax seed that is easily obtained at your grocery store. Make sure it is ground up and sprinkle 2 tablespoons on your cereal each morning and drink a large glass of water with it.  You can also mix in  yogurt, and even bake in bread or muffins.    Flax seed seems to give less gas and does not have any taste.   If these are not working for you I would be glad to see you back in my clinic to discuss other options.  Call [phone]: to set up an appointment.        Assessment: umbilical hernia and was able to reduce it.    Plan to do open umbilical hernia repair with mesh.   Discussed robotic but there are risks of hernia from the trocar sites and this is just a little bigger then my finger.      Risks of surgery include damage to nerves, bleeding, infection, damage to  Vessels, recurrence.  Although mesh is a better long term repair if it gets infected it must be removed.   If the patient has any bacterial infection the week before and is seen by their doctor and started on antibiotics, I can probably still do the surgery if they are vastly improved by the time of surgery, but if the infection starts closer to the surgery date it will be better to cancel and reschedule to a later date.  A cough will also be hard on the repair and uncomfortable post operative.  If the patient is a smoker I did discuss increase risk of recurrence and more pain with the cough.  If the patient is willing to quit smoking would encourage to do so and start at least a week before surgery.  However, if patient is not going to quit then must understand that his repair is more likely to fail.    Risks of surgery discussed including, but not limited to bleeding, infection, recurrence, damage to nerves and what is in the hernia sac.  Risks of anesthesia also discussed.    Discussed massaging hernia back in and using ice if becomes more painful.  If not able to reduce then go to emergency room.  Also discussed hernia belt to use until able to get in for surgery.    Things you will need to do before surgery are getting a preoperative appointment with your primary care doctor to be cleared for surgery.    You will also need a COVID test  before surgery and an appointment to see me usually about 2 weeks after the procedure to make sure you are doing well.   Fortunately, when the schedulers call you they will try to get all this set up for you at that one call.     If you have had a positive COVID test in a 90 day window that would include the date of the procedure, let us know that and will need to see proof of this.    Then you do not need a COVID test.  But if over 90 days you do.      Time spent with the patient with greater that 50% of the time in discussion was 30 minutes.  In discussing the plan and options .      Judson Barrios MD

## 2022-09-19 NOTE — NURSING NOTE
"Initial BP (!) 150/80 (BP Location: Right arm, Patient Position: Sitting, Cuff Size: Adult Large)   Pulse 77   Temp 98.7  F (37.1  C) (Tympanic)   Ht 1.778 m (5' 10\")   Wt 108.9 kg (240 lb)   BMI 34.44 kg/m   Estimated body mass index is 34.44 kg/m  as calculated from the following:    Height as of this encounter: 1.778 m (5' 10\").    Weight as of this encounter: 108.9 kg (240 lb). .    Hillary Mccarthy CMA    "

## 2022-09-20 ENCOUNTER — HOSPITAL ENCOUNTER (OUTPATIENT)
Facility: CLINIC | Age: 42
End: 2022-09-20
Attending: SURGERY | Admitting: SURGERY

## 2022-10-14 ENCOUNTER — OFFICE VISIT (OUTPATIENT)
Dept: FAMILY MEDICINE | Facility: CLINIC | Age: 42
End: 2022-10-14

## 2022-10-14 ENCOUNTER — PATIENT OUTREACH (OUTPATIENT)
Dept: CARE COORDINATION | Facility: CLINIC | Age: 42
End: 2022-10-14

## 2022-10-14 VITALS
SYSTOLIC BLOOD PRESSURE: 120 MMHG | TEMPERATURE: 98.4 F | DIASTOLIC BLOOD PRESSURE: 72 MMHG | HEART RATE: 86 BPM | BODY MASS INDEX: 36.79 KG/M2 | HEIGHT: 70 IN | RESPIRATION RATE: 18 BRPM | WEIGHT: 257 LBS | OXYGEN SATURATION: 99 %

## 2022-10-14 DIAGNOSIS — Z01.818 PREOP GENERAL PHYSICAL EXAM: Primary | ICD-10-CM

## 2022-10-14 DIAGNOSIS — R56.9 CONVULSIONS, UNSPECIFIED CONVULSION TYPE (H): ICD-10-CM

## 2022-10-14 DIAGNOSIS — Z59.71 DOES NOT HAVE HEALTH INSURANCE: ICD-10-CM

## 2022-10-14 DIAGNOSIS — K42.9 UMBILICAL HERNIA WITHOUT OBSTRUCTION AND WITHOUT GANGRENE: ICD-10-CM

## 2022-10-14 LAB
CARBAMAZEPINE SERPL-MCNC: 6.7 UG/ML (ref 4–12)
CREAT SERPL-MCNC: 0.64 MG/DL (ref 0.51–0.95)
GFR SERPL CREATININE-BSD FRML MDRD: >90 ML/MIN/1.73M2
HGB BLD-MCNC: 13.7 G/DL (ref 11.7–15.7)
HOLD SPECIMEN: NORMAL

## 2022-10-14 PROCEDURE — 85018 HEMOGLOBIN: CPT | Performed by: NURSE PRACTITIONER

## 2022-10-14 PROCEDURE — 99214 OFFICE O/P EST MOD 30 MIN: CPT | Performed by: NURSE PRACTITIONER

## 2022-10-14 PROCEDURE — 80156 ASSAY CARBAMAZEPINE TOTAL: CPT | Performed by: NURSE PRACTITIONER

## 2022-10-14 PROCEDURE — 36415 COLL VENOUS BLD VENIPUNCTURE: CPT | Performed by: NURSE PRACTITIONER

## 2022-10-14 PROCEDURE — 82565 ASSAY OF CREATININE: CPT | Performed by: NURSE PRACTITIONER

## 2022-10-14 RX ORDER — CARBAMAZEPINE 200 MG/1
600 TABLET ORAL 2 TIMES DAILY
Qty: 540 TABLET | Refills: 4 | Status: SHIPPED | OUTPATIENT
Start: 2022-10-14 | End: 2023-12-18

## 2022-10-14 ASSESSMENT — PAIN SCALES - GENERAL: PAINLEVEL: MODERATE PAIN (4)
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
## 2022-10-14 NOTE — PROGRESS NOTES
"Clinic Care Coordination Contact  [facility]/Voicemail    Reason for Referral: Financial Support   Financial Support: Insurance   Clinical Staff have discussed the Care Coordination Referral with the patient and/or caregiver:       Clinical Data: Care Coordinator Outreach  Outreach attempted x 1.  CHW received message from : \"We're sorry, your call cannot be completed at this time, please hang up and try your call again later\".    Plan: CHW will try to reach patient again in 1-2 business days.    Jazz RAO  Community Health Worker  [facility]  Clinic Care Coordination  [facility]  [email or web address]   Office: [phone]    "

## 2022-10-14 NOTE — PATIENT INSTRUCTIONS
Preparing for Your Surgery  Getting started  A nurse will call you to review your health history and instructions. They will give you an arrival time based on your scheduled surgery time. Please be ready to share:    Your doctor's clinic name and phone number    Your medical, surgical and anesthesia history    A list of allergies and sensitivities    A list of medicines, including herbal treatments and over-the-counter drugs    Whether the patient has a legal guardian (ask how to send us the papers in advance)  Please tell us if you're pregnant--or if there's any chance you might be pregnant. Some surgeries may injure a fetus (unborn baby), so they require a pregnancy test. Surgeries that are safe for a fetus don't always need a test, and you can choose whether to have one.   If you have a child who's having surgery, please ask for a copy of Preparing for Your Child's Surgery.    Preparing for surgery    Within 10 to 30 days of surgery: Have a pre-op exam (sometimes called an H&P, or History and Physical). This can be done at a clinic or pre-operative center.  ? If you're having a , you may not need this exam. Talk to your care team.    At your pre-op exam, talk to your care team about all medicines you take. If you need to stop any medicines before surgery, ask when to start taking them again.  ? We do this for your safety. Many medicines can make you bleed too much during surgery. Some change how well surgery (anesthesia) drugs work.    Call your insurance company to let them know you're having surgery. (If you don't have insurance, call [phone].)    Call your clinic if there's any change in your health. This includes signs of a cold or flu (sore throat, runny nose, cough, rash, fever). It also includes a scrape or scratch near the surgery site.    If you have questions on the day of surgery, call your hospital or surgery center.  COVID testing  You may need to be tested for COVID-19 before having  surgery. If so, we will give you instructions (or click here).  Eating and drinking guidelines  For your safety: Unless your surgeon tells you otherwise, follow the guidelines below.    Eat and drink as usual until 8 hours before surgery. After that, no food or milk.    Drink clear liquids until 2 hours before surgery. These are liquids you can see through, like water, Gatorade and Propel Water. You may also have black coffee and tea (no cream or milk).    Nothing by mouth within 2 hours of surgery. This includes gum, candy and breath mints.    If you drink alcohol: Stop drinking it the night before surgery.    If your care team tells you to take medicine on the morning of surgery, it's okay to take it with a sip of water.  Preventing infection    Shower or bathe the night before and morning of your surgery. Follow the instructions your clinic gave you. (If no instructions, use regular soap.)    Don't shave or clip hair near your surgery site. We'll remove the hair if needed.    Don't smoke or vape the morning of surgery. You may chew nicotine gum up to 2 hours before surgery. A nicotine patch is okay.  ? Note: Some surgeries require you to completely quit smoking and nicotine. Check with your surgeon.    Your care team will make every effort to keep you safe from infection. We will:  ? Clean our hands often with soap and water (or an alcohol-based hand rub).  ? Clean the skin at your surgery site with a special soap that kills germs.  ? Give you a special gown to keep you warm. (Cold raises the risk of infection.)  ? Wear special hair covers, masks, gowns and gloves during surgery.  ? Give antibiotic medicine, if prescribed. Not all surgeries need antibiotics.  What to bring on the day of surgery    Photo ID and insurance card    Copy of your health care directive, if you have one    Glasses and hearing aides (bring cases)  ? You can't wear contacts during surgery    Inhaler and eye drops, if you use them (tell us  about these when you arrive)    CPAP machine or breathing device, if you use them    A few personal items, if spending the night    If you have . . .  ? A pacemaker, ICD (cardiac defibrillator) or other implant: Bring the ID card.  ? An implanted stimulator: Bring the remote control.  ? A legal guardian: Bring a copy of the certified (court-stamped) guardianship papers.  Please remove any jewelry, including body piercings. Leave jewelry and other valuables at home.  If you're going home the day of surgery    You must have a responsible adult drive you home. They should stay with you overnight as well.    If you don't have someone to stay with you, and you aren't safe to go home alone, we may keep you overnight. Insurance often won't pay for this.  After surgery  If it's hard to control your pain or you need more pain medicine, please call your surgeon's office.  Questions?   If you have any questions for your care team, list them here: _________________________________________________________________________________________________________________________________________________________________________ ____________________________________ ____________________________________ ____________________________________  For informational purposes only. Not to replace the advice of your health care provider. Copyright   2003, 2019 [facility]. All rights reserved. Clinically reviewed by Susan Rico MD. [employer] [id number] - REV 07/22.

## 2022-10-16 ENCOUNTER — ANESTHESIA EVENT (OUTPATIENT)
Dept: SURGERY | Facility: CLINIC | Age: 42
End: 2022-10-16

## 2022-10-16 RX ORDER — ACETAMINOPHEN 325 MG/1
975 TABLET ORAL ONCE
Status: CANCELLED | OUTPATIENT
Start: 2022-10-16 | End: 2022-10-16

## 2022-10-16 RX ORDER — SODIUM CHLORIDE, SODIUM LACTATE, POTASSIUM CHLORIDE, CALCIUM CHLORIDE 600; 310; 30; 20 MG/100ML; MG/100ML; MG/100ML; MG/100ML
INJECTION, SOLUTION INTRAVENOUS CONTINUOUS
Status: CANCELLED | OUTPATIENT
Start: 2022-10-16

## 2022-10-16 RX ORDER — GABAPENTIN 300 MG/1
300 CAPSULE ORAL
Status: CANCELLED | OUTPATIENT
Start: 2022-10-16

## 2022-10-16 RX ORDER — LIDOCAINE 40 MG/G
CREAM TOPICAL
Status: CANCELLED | OUTPATIENT
Start: 2022-10-16

## 2022-10-16 ASSESSMENT — LIFESTYLE VARIABLES: TOBACCO_USE: 1

## 2022-10-16 NOTE — ANESTHESIA PREPROCEDURE EVALUATION
Anesthesia Pre-Procedure Evaluation    Patient: Gunnar Cheney   MRN: [id number] : 1980        Procedure : Procedure(s):  HERNIORRHAPHY, UMBILICAL, OPEN          Past Medical History:   Diagnosis Date     ASCUS of cervix with negative high risk HPV 2017     Convulsion (H)      Urinary tract infection, site not specified       Past Surgical History:   Procedure Laterality Date     C/SECTION, CLASSICAL  2006    , Classical     C/SECTION, CLASSICAL      , Classical     TONSILLECTOMY & ADENOIDECTOMY  1991     TUBAL LIGATION  2006      Allergies   Allergen Reactions     Nkda [No Known Drug Allergies]       Social History     Tobacco Use     Smoking status: Former     Packs/day: 0.10     Years: 7.00     Pack years: 0.70     Types: Cigarettes     Start date: 2015     Smokeless tobacco: Never     Tobacco comments:     3-4 ciggs per day   Substance Use Topics     Alcohol use: Yes     Comment: occ      Wt Readings from Last 1 Encounters:   10/14/22 116.6 kg (257 lb)        Anesthesia Evaluation   Pt has had prior anesthetic. Type: General and Regional.        ROS/MED HX  ENT/Pulmonary:     (+) tobacco use,     Neurologic: Comment: Convulsion       Cardiovascular:     (+) hypertension-----    METS/Exercise Tolerance:     Hematologic:       Musculoskeletal:       GI/Hepatic:       Renal/Genitourinary:       Endo:     (+) Obesity,     Psychiatric/Substance Use: Comment: Self-injurious behavior      Infectious Disease:       Malignancy:       Other:               OUTSIDE LABS:  CBC:   Lab Results   Component Value Date    WBC 9.1 2022    WBC 7.4 2021    HGB 13.7 10/14/2022    HGB 13.6 2022    HCT 40.3 2022    HCT 39.5 2021     2022     2021     BMP:   Lab Results   Component Value Date     2022     2021    POTASSIUM 3.6 2022    POTASSIUM 3.8 2021    CHLORIDE 104 2022     CHLORIDE 108 (H) 07/23/2021    CO2 25 09/12/2022    CO2 24 07/23/2021    BUN 12.0 09/12/2022    BUN 12 07/23/2021    CR 0.64 10/14/2022    CR 0.65 09/12/2022    GLC 87 09/12/2022    GLC 89 07/23/2021     COAGS: No results found for: PTT, INR, FIBR  POC:   Lab Results   Component Value Date    HCG Negative 07/15/2014    HCGS Negative 07/23/2021     HEPATIC:   Lab Results   Component Value Date    ALBUMIN 4.0 09/12/2022    PROTTOTAL 6.7 09/12/2022    ALT 28 09/12/2022    AST 29 09/12/2022    ALKPHOS 62 09/12/2022    BILITOTAL 0.3 09/12/2022     OTHER:   Lab Results   Component Value Date    LACT 0.6 (L) 09/12/2022    KIMMY 8.9 09/12/2022    LIPASE 13 07/23/2021    AMYLASE <30 (L) 11/25/2008    TSH 1.40 05/13/2015    T4 0.64 (L) 06/22/2011    SED 22 (H) 06/12/2009               Quinn Slaughter, CRNA, APRN CRNA

## 2022-10-18 ENCOUNTER — PATIENT OUTREACH (OUTPATIENT)
Dept: CARE COORDINATION | Facility: CLINIC | Age: 42
End: 2022-10-18

## 2022-10-18 NOTE — PROGRESS NOTES
Clinic Care Coordination Contact  [facility]/Voicemail       Clinical Data: Care Coordinator Outreach  Outreach attempted x 2.  Left message on patient's voicemail with call back information and requested return call.  Plan: Care Coordinator will send care coordination introduction letter with care coordinator contact information and explanation of care coordination services via [employer]. Care Coordinator will do no further outreaches at this time.    NEYMAR Obrien  [facility] Care Coordination  [facility]  [phone]

## 2022-10-20 ENCOUNTER — ANESTHESIA (OUTPATIENT)
Dept: SURGERY | Facility: CLINIC | Age: 42
End: 2022-10-20

## 2022-12-15 NOTE — PATIENT INSTRUCTIONS
Preventive Health Recommendations  Female Ages 26 - 39  Yearly exam:   See your health care provider every year in order to    Review health changes.     Discuss preventive care.      Review your medicines if you your doctor has prescribed any.    Until age 30: Get a Pap test every three years (more often if you have had an abnormal result).    After age 30: Talk to your doctor about whether you should have a Pap test every 3 years or have a Pap test with HPV screening every 5 years.   You do not need a Pap test if your uterus was removed (hysterectomy) and you have not had cancer.  You should be tested each year for STDs (sexually transmitted diseases), if you're at risk.   Talk to your provider about how often to have your cholesterol checked.  If you are at risk for diabetes, you should have a diabetes test (fasting glucose).  Shots: Get a flu shot each year. Get a tetanus shot every 10 years.   Nutrition:     Eat at least 5 servings of fruits and vegetables each day.    Eat whole-grain bread, whole-wheat pasta and brown rice instead of white grains and rice.    Talk to your provider about Calcium and Vitamin D.     Lifestyle    Exercise at least 150 minutes a week (30 minutes a day, 5 days of the week). This will help you control your weight and prevent disease.    Limit alcohol to one drink per day.    No smoking.     Wear sunscreen to prevent skin cancer.    See your dentist every six months for an exam and cleaning.     Olumiant Counseling: I discussed with the patient the risks of Olumiant therapy including but not limited to upper respiratory tract infections, shingles, cold sores, and nausea. Live vaccines should be avoided.  This medication has been linked to serious infections; higher rate of mortality; malignancy and lymphoproliferative disorders; major adverse cardiovascular events; thrombosis; gastrointestinal perforations; neutropenia; lymphopenia; anemia; liver enzyme elevations; and lipid elevations.

## 2023-03-13 ENCOUNTER — HOSPITAL ENCOUNTER (EMERGENCY)
Facility: CLINIC | Age: 43
Discharge: HOME | End: 2023-03-13
Attending: NURSE PRACTITIONER | Admitting: NURSE PRACTITIONER
Payer: COMMERCIAL

## 2023-03-13 VITALS
HEIGHT: 70 IN | WEIGHT: 220 LBS | SYSTOLIC BLOOD PRESSURE: 134 MMHG | HEART RATE: 88 BPM | TEMPERATURE: 98.5 F | RESPIRATION RATE: 18 BRPM | OXYGEN SATURATION: 99 % | DIASTOLIC BLOOD PRESSURE: 98 MMHG | BODY MASS INDEX: 31.5 KG/M2

## 2023-03-13 DIAGNOSIS — Z76.0 ENCOUNTER FOR MEDICATION REFILL: ICD-10-CM

## 2023-03-13 PROCEDURE — 99284 EMERGENCY DEPT VISIT MOD MDM: CPT | Performed by: NURSE PRACTITIONER

## 2023-03-13 PROCEDURE — 99283 EMERGENCY DEPT VISIT LOW MDM: CPT | Performed by: NURSE PRACTITIONER

## 2023-03-13 PROCEDURE — 250N000013 HC RX MED GY IP 250 OP 250 PS 637: Performed by: NURSE PRACTITIONER

## 2023-03-13 RX ORDER — CARBAMAZEPINE 200 MG/1
600 TABLET ORAL ONCE
Status: COMPLETED | OUTPATIENT
Start: 2023-03-13 | End: 2023-03-13

## 2023-03-13 RX ADMIN — CARBAMAZEPINE 600 MG: 200 TABLET ORAL at 22:34
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
## 2023-03-16 ENCOUNTER — TELEPHONE (OUTPATIENT)
Dept: FAMILY MEDICINE | Facility: CLINIC | Age: 43
End: 2023-03-16
Payer: COMMERCIAL

## 2023-03-16 NOTE — TELEPHONE ENCOUNTER
Patient Quality Outreach    Patient is due for the following:   Cervical Cancer Screening - PAP Needed  Physical Preventive Adult Physical      Topic Date Due     Hepatitis B Vaccine (1 of 3 - 3-dose series) Never done     COVID-19 Vaccine (1) Never done     Flu Vaccine (1) 09/01/2022       Next Steps:   Schedule a Adult Preventative    Type of outreach:    Sent [facility] message.    Next Steps:  Reach out within 90 days via [employer] [facility].    Max number of attempts reached: No. Will try again in 90 days if patient still on fail list.    Questions for provider review:    None     Zamzam Callaway MA

## 2023-07-07 ENCOUNTER — OFFICE VISIT (OUTPATIENT)
Dept: FAMILY MEDICINE | Facility: CLINIC | Age: 43
End: 2023-07-07
Payer: COMMERCIAL

## 2023-07-07 VITALS
BODY MASS INDEX: 33.07 KG/M2 | OXYGEN SATURATION: 99 % | DIASTOLIC BLOOD PRESSURE: 89 MMHG | WEIGHT: 231 LBS | RESPIRATION RATE: 20 BRPM | SYSTOLIC BLOOD PRESSURE: 132 MMHG | TEMPERATURE: 98.6 F | HEIGHT: 70 IN | HEART RATE: 74 BPM

## 2023-07-07 DIAGNOSIS — M54.16 LUMBAR RADICULOPATHY: Primary | ICD-10-CM

## 2023-07-07 DIAGNOSIS — M54.42 ACUTE LEFT-SIDED LOW BACK PAIN WITH LEFT-SIDED SCIATICA: ICD-10-CM

## 2023-07-07 PROCEDURE — 99214 OFFICE O/P EST MOD 30 MIN: CPT | Performed by: NURSE PRACTITIONER

## 2023-07-07 RX ORDER — METHYLPREDNISOLONE 4 MG
TABLET, DOSE PACK ORAL
Qty: 21 TABLET | Refills: 0 | Status: SHIPPED | OUTPATIENT
Start: 2023-07-07 | End: 2023-07-19

## 2023-07-07 RX ORDER — HYDROCODONE BITARTRATE AND ACETAMINOPHEN 5; 325 MG/1; MG/1
1 TABLET ORAL DAILY PRN
Qty: 7 TABLET | Refills: 0 | Status: SHIPPED | OUTPATIENT
Start: 2023-07-07 | End: 2023-07-19

## 2023-07-07 RX ORDER — METHOCARBAMOL 500 MG/1
500 TABLET, FILM COATED ORAL 2 TIMES DAILY PRN
Qty: 30 TABLET | Refills: 0 | Status: SHIPPED | OUTPATIENT
Start: 2023-07-07 | End: 2023-07-19

## 2023-07-07 ASSESSMENT — PAIN SCALES - GENERAL: PAINLEVEL: SEVERE PAIN (6)

## 2023-07-07 NOTE — PROGRESS NOTES
"  Assessment & Plan     Lumbar radiculopathy  -off work until July 18 th, than if feeling well can return with restrictions  -start physical therapy   -if no improvement will consider MRI and steroid injection   - methylPREDNISolone (MEDROL DOSEPAK) 4 MG tablet therapy pack; Follow Package Directions  - methocarbamol (ROBAXIN) 500 MG tablet; Take 1 tablet (500 mg) by mouth 2 times daily as needed for muscle spasms  - Physical Therapy Referral; Future  - HYDROcodone-acetaminophen (NORCO) 5-325 MG tablet; Take 1 tablet by mouth daily as needed for pain    Acute left-sided low back pain with left-sided sciatica    - methylPREDNISolone (MEDROL DOSEPAK) 4 MG tablet therapy pack; Follow Package Directions        BMI:   Estimated body mass index is 33.15 kg/m  as calculated from the following:    Height as of this encounter: 1.778 m (5' 10\").    Weight as of this encounter: 104.8 kg (231 lb).   Weight management plan: Discussed healthy diet and exercise guidelines        DIONNA Martins CNP  [facility] is a 43 year old, presenting for the following health issues:  Back Pain        7/7/2023     9:06 AM   Additional Questions   Roomed by Thalia LAM     History of Present Illness       Back Pain:  She presents for follow up of back pain. Patient's back pain is a new problem.    Original cause of back pain: other  First noticed back pain: more than 1 month ago  Patient feels back pain: dailyLocation of back pain:  Left lower back  Description of back pain: burning, dull ache, shooting and stabbing  Back pain spreads: left buttocks, left thigh, left knee and left foot    Since patient first noticed back pain, pain is: rapidly worsening  Does back pain interfere with her job:  Yes  On a scale of 1-10 (10 being the worst), patient describes pain as:  8  What makes back pain worse: lying down, sitting and standing  Acupuncture: not tried  Acetaminophen: not helpful  Activity or " "exercise: helpful  Chiropractor:  Helpful  Cold: not helpful  Heat: helpful  Massage: not helpful  Muscle relaxants: not tried  NSAIDS: not helpful  Opioids: not tried  Physical Therapy: not tried  Rest: not helpful  Steroid Injection: not tried  Stretching: helpful  Surgery: not tried  TENS unit: helpful  Topical pain relievers: not helpful  Other healthcare providers patient is seeing for back pain: Chiropractor    Reason for visit:  Sciatic pain/not sleeping/numbness  Symptom onset:  1-2 weeks ago    She eats 4 or more servings of fruits and vegetables daily.She consumes 0 sweetened beverage(s) daily.She exercises with enough effort to increase her heart rate 60 or more minutes per day.  She exercises with enough effort to increase her heart rate 5 days per week.   She is taking medications regularly.     Review of Systems   Constitutional, HEENT, cardiovascular, pulmonary, gi and gu systems are negative, except as otherwise noted.      Objective    /89   Pulse 74   Temp 98.6  F (37  C) (Tympanic)   Resp 20   Ht 1.778 m (5' 10\")   Wt 104.8 kg (231 lb)   LMP 07/01/2023 (Exact Date)   SpO2 99%   BMI 33.15 kg/m    Body mass index is 33.15 kg/m .  Physical Exam   GENERAL: healthy, alert and no distress  MS: no gross musculoskeletal defects noted, no edema  NEURO: Normal strength and tone, mentation intact and speech normal  Comprehensive back pain exam:  Straight leg positive on  left, indicating possible ipsilateral radiculopathy  PSYCH: mentation appears normal, affect normal/bright                    "

## 2023-07-07 NOTE — PATIENT INSTRUCTIONS
Medrol steroid pack for 6 days   Robaxin 500 mg 3 times daily as needed for pain  Physical therapy  Off work until July 18 th  Tylenol 500 mg 4 times daily   Norco one tablet daily as needed for severe pain     Will consider MRI if no improvement in 1-2 weeks

## 2023-07-07 NOTE — LETTER
July 7, 2023      Nathaliea L Noni  [address]  [facility] [id number]        To Whom It May Concern:    Gunnar Cheney was seen in our clinic. She may return to work with the following: limited to light duty - lifting no greater than 20 pounds, no bending/stooping, and standing limited to 4 hrs per day on July 18 th for one week       Sincerely,        DIONNA Martins CNP

## 2023-07-10 ENCOUNTER — TELEPHONE (OUTPATIENT)
Dept: FAMILY MEDICINE | Facility: CLINIC | Age: 43
End: 2023-07-10
Payer: COMMERCIAL

## 2023-07-10 NOTE — TELEPHONE ENCOUNTER
Gretel,    Patient is reached about her ongoing back pain.  Was seen 3 days ago.  Not any better.  Is using heat as ice does nothing for her.  Rates pain 4/10 now but last night was 10/10.  Has been using icy hot, tiger balm.  Has not scheduled for PT and is going out of town soon.  Patient has one day left of medrol dose pack.  She increased the methocarbamol to 2000 mg daily to get relief.  Please advise. Brittanie TOUSSAINT RN

## 2023-07-10 NOTE — TELEPHONE ENCOUNTER
Symptoms    Describe your symptoms: Pt saw Gretel Sharma 7/7 for back pain and pt has been taking narcotic pain med, steroid and doubling up on steroid.  Pt states that the pain is not any better.  Please call patient and advise.      Any pain: Yes:     How long have you been having symptoms: Ongoing      Have you been seen for this:  Yes    Appointment offered?: No    Triage offered?: Yes:     Home remedies tried: Rxs    Preferred Pharmacy:   [id number] - [place], [place] - [address] [place] [address] [address]  [address] [place] [address] [address]  [facility] [address]  Phone: [phone] Fax: [phone]    Could we send this information to you in [place] or would you prefer to receive a phone call?:   Patient would prefer a phone call   Okay to leave a detailed message?: Yes at Home number on file [phone] (home)

## 2023-07-10 NOTE — TELEPHONE ENCOUNTER
Covering for Gretel:    My advice is that she schedule physical therapy as recommended by Gretel.  When the Medrol dose pack is completed, she may restart ibuprofen 600 mg every 6 hours.  She can also use Tylenol 1000 mg every 8 hours as needed.  If no improvement with physical therapy, she should follow-up.  Keyonna Barraza, CNP

## 2023-07-11 ENCOUNTER — APPOINTMENT (OUTPATIENT)
Dept: GENERAL RADIOLOGY | Facility: CLINIC | Age: 43
End: 2023-07-11
Attending: EMERGENCY MEDICINE
Payer: COMMERCIAL

## 2023-07-11 ENCOUNTER — HOSPITAL ENCOUNTER (EMERGENCY)
Facility: CLINIC | Age: 43
Discharge: HOME | End: 2023-07-11
Attending: NURSE PRACTITIONER | Admitting: NURSE PRACTITIONER
Payer: COMMERCIAL

## 2023-07-11 VITALS
RESPIRATION RATE: 16 BRPM | HEART RATE: 58 BPM | WEIGHT: 230 LBS | SYSTOLIC BLOOD PRESSURE: 132 MMHG | TEMPERATURE: 97.8 F | OXYGEN SATURATION: 100 % | DIASTOLIC BLOOD PRESSURE: 90 MMHG | BODY MASS INDEX: 32.93 KG/M2 | HEIGHT: 70 IN

## 2023-07-11 DIAGNOSIS — M25.561 ACUTE PAIN OF RIGHT KNEE: ICD-10-CM

## 2023-07-11 PROCEDURE — 250N000011 HC RX IP 250 OP 636: Mod: JZ | Performed by: NURSE PRACTITIONER

## 2023-07-11 PROCEDURE — 73562 X-RAY EXAM OF KNEE 3: CPT | Mod: RT

## 2023-07-11 PROCEDURE — 99283 EMERGENCY DEPT VISIT LOW MDM: CPT | Performed by: NURSE PRACTITIONER

## 2023-07-11 PROCEDURE — 96372 THER/PROPH/DIAG INJ SC/IM: CPT | Performed by: NURSE PRACTITIONER

## 2023-07-11 PROCEDURE — 99284 EMERGENCY DEPT VISIT MOD MDM: CPT | Mod: 25 | Performed by: NURSE PRACTITIONER

## 2023-07-11 PROCEDURE — 29505 APPLICATION LONG LEG SPLINT: CPT | Mod: RT | Performed by: NURSE PRACTITIONER

## 2023-07-11 RX ORDER — KETOROLAC TROMETHAMINE 30 MG/ML
60 INJECTION, SOLUTION INTRAMUSCULAR; INTRAVENOUS ONCE
Status: COMPLETED | OUTPATIENT
Start: 2023-07-11 | End: 2023-07-11

## 2023-07-11 RX ADMIN — KETOROLAC TROMETHAMINE 60 MG: 30 INJECTION, SOLUTION INTRAMUSCULAR at 16:26

## 2023-07-11 ASSESSMENT — ACTIVITIES OF DAILY LIVING (ADL): ADLS_ACUITY_SCORE: 37

## 2023-07-11 NOTE — ED TRIAGE NOTES
"Pt here with right knee pain. Pt states that she was standing at work today and \"it just gave out, now I feel a lump that isn't supposed to be there\".      Triage Assessment     Row Name 07/11/23 4921       Triage Assessment (Adult)    Airway WDL WDL       Respiratory WDL    Respiratory WDL WDL       Skin Circulation/Temperature WDL    Skin Circulation/Temperature WDL WDL       Cardiac WDL    Cardiac WDL WDL       Peripheral/Neurovascular WDL    Peripheral Neurovascular WDL WDL       Cognitive/Neuro/Behavioral WDL    Cognitive/Neuro/Behavioral WDL WDL              "

## 2023-07-11 NOTE — DISCHARGE INSTRUCTIONS
Use brace around knee to help with stabilization  Apply ice to knee daily 15-20 minutes at at time, 3-4 times through the day  Use crutches for no weight bearing until cleared by ortho  No rigorous activity or sports for the next week  Use ibuprofen or tylenol for discomfort.   Supplement with your already prescribed Norco for more severe pain. Don't drive, operate machinery, climb ladders or participate in any other activity that could be considered dangerous while taking.  Follow this week with ortho department. If they do not call you by mid-morning of the next business day, please call the hospital and ask for that department's appointment desk. Tell them you were seen in the ER and asked to be seen for follow-up.

## 2023-07-11 NOTE — ED PROVIDER NOTES
"ID:   Gunnar Cheney      CC:   Knee Pain       HPI:  Gunnar Cheney is a 43 year old female with complaints of: Knee pain. Localizes to the right  knee. No preceding fall, trauma or injury. She was at work today, went to slightly turn to the left when she had sudden onset of right sided knee weakness, the knee just gave out on her and she has felt like she has a lump on the lateral aspect of the knees since this occurred. No injuries sustained in the process of the knee giving out. No associated symptoms. Skin is intact. ROM is painful with active extension. Sensation is without numbness or tingling. Self care to this point includes: Norco. Patient has no history of fracture or surgery to this area of concern in the past. This is not an artificial joint.     Triage Note:   Pt here with right knee pain. Pt states that she was standing at work today and \"it just gave out, now I feel a lump that isn't supposed to be there\".       I have reviewed the PMH, Meds, Allergies, SH, and FH, including:    PAST MEDICAL HISTORY:   ASCUS of cervix with negative high risk HPV 04/13/2017    Convulsion (H)     Urinary tract infection, site not specified        PROBLEM LIST:   Convulsions (H)    Generalized anxiety disorder    Obesity    Transient hypertension of pregnancy, antepartum    CARDIOVASCULAR SCREENING; LDL GOAL LESS THAN 160    Shoulder instability    Tobacco abuse    Self-injurious behavior    Health Care Home    ASCUS of cervix with negative high risk HPV    Morbid obesity (H)       FAMILY MEDICAL HISTORY:  Reviewed and non-contributory to presenting complaint.      MEDICATIONS:   carBAMazepine (EPITOL) 200 MG tablet    HYDROcodone-acetaminophen (NORCO) 5-325 MG tablet    methocarbamol (ROBAXIN) 500 MG tablet    methylPREDNISolone (MEDROL DOSEPAK) 4 MG tablet therapy pack       ALLERGIES:   Nkda [No Known Drug Allergy]        ROS:  As per HPI. All other systems reviewed and appear to be negative.      PHYSICAL " "EXAM:  Blood pressure (!) 164/116, pulse 80, temperature 97.8  F (36.6  C), temperature source Tympanic, resp. rate 16, height 1.778 m (5' 10\"), weight 104.3 kg (230 lb), last menstrual period 07/01/2023, SpO2 100 %, not currently breastfeeding.  GENERAL APPEARANCE: Healthy; alert and oriented X3; no acute distress  SKIN: Normal without erythema or bruising  NEUROLOGIC: Sensation intact proximally and distal to injury to both light touch and pressure  MUSCULOSKELETAL/RIGHT KNEE: No gross deformity or malalignment. Normal joint contours. No obvious effusion. Patella with normal tracking and no ballotment. Full range of motion, better with leg hanging over bed. Normal strength on extension and normal strength on flexion against resistance. Joint line pain laterally but not medially. No crepitus. Melita negative for pain medially and laterally. No popliteal mass or palpable tenderness.  EXTREMITIES: Tibial and pedal pulse strong and intact, cap refill <2 seconds, warm      IMAGING:  Independently reviewed imaging and agree with radiologist's findings as outlined below:      XR Knee Right 3 Views   Final Result   IMPRESSION: Moderate osteoarthrosis of the medial compartment. Mild   osteoarthrosis of the lateral compartment. Moderate osteoarthrosis of   the patellofemoral compartment. No fracture or effusion. Ossified   intra-articular body in the anterior central aspect of the joint.   Otherwise negative.      DYLON TEAGUE MD            SYSTEM ID:  [id number]               EMERGENCY DEPARTMENT COURSE/ MDM:  Gunnar Cheney is a 43 year old female whom presented to ED with right knee pain as the result of unknown cause, simply giving out on her while she was at work today and went to turn to the left. Previous records were reviewed, including previous ED visit that goes back to 3/13/23 when patient was seen for refill on her Tegratol. Vitals upon arrival Blood pressure (!) 164/116, pulse 80, temperature 97.8  F " "(36.6  C), temperature source Tympanic, resp. rate 16, height 1.778 m (5' 10\"), weight 104.3 kg (230 lb), last menstrual period 07/01/2023, SpO2 100 %, not currently breastfeeding. HTN noted. History obtained and exam completed. Imaging obtained includes: XR Right Knee which was independently reviewed. Rad report showed moderate osteoarthrosis of the medial compartment. Mild osteoarthrosis of the lateral compartment. Moderate osteoarthrosis of the patellofemoral compartment. No fracture or effusion. Ossified intra-articular body in the anterior central aspect of the joint. Otherwise negative.  Placed right knee in brace and fit with crutches. Provided single 60mg IM dose of Toradol. Will treat as Acute pain of right knee and provide referral to ortho. Given reassuring imaging, exam, VS, and lack of associated symptoms, will allow to d/c to home.     Impression and plan discussed with patient. Questions answered, concerns addressed, indications for urgent re-evaluation reviewed, and  given. Patient and parent/caregiver agree with treatment plan and have no further questions at this time. Patient discharged in good condition. Was given [facility] discharge instructions and follow-up recommendations. Patient will return to the ED if their symptoms worsen or they develop any of the concerning signs/symptoms as outlined in the EPIC d/c instructions. Otherwise, patient will follow up with primary care provider as directed in discharge summary.      ASSESSMENT:  Acute pain of right knee        PLAN (as discussed with patient):  Use brace around knee to help with stabilization  Apply ice to knee daily 15-20 minutes at at time, 3-4 times through the day  Use crutches for no weight bearing until cleared by ortho  No rigorous activity or sports for the next week  Use ibuprofen or tylenol for discomfort.   Supplement with your already prescribed Norco for more severe pain. Don't drive, operate machinery, climb ladders or " participate in any other activity that could be considered dangerous while taking.  Follow this week with ortho department. If they do not call you by mid-morning of the next business day, please call the hospital and ask for that department's appointment desk. Tell them you were seen in the ER and asked to be seen for follow-up.            Radha Lucia NP  07/11/23 4594

## 2023-07-11 NOTE — ED NOTES
Patient at work today made small lateral movement and R. Knee gave out on patient.  Patient reports H/O L5 DDD with LLE numbness and tingling.

## 2023-07-11 NOTE — Clinical Note
Gunnar Cheney was seen and treated in our emergency department on 7/11/2023.         Sincerely,     [facility] Emergency Dept

## 2023-07-11 NOTE — TELEPHONE ENCOUNTER
Patient notified with understanding.   She's actually on her way to ER right now because she injured/tweaked her knee on a hike.  She reports she heard something pop, and knee gave out, has trouble weight bearing.  RN agrees with evaluation now.   Then patient thought she had a f/u appt scheduled with Gretel for 7/18/23; however, there is nothing scheduled and Gretel isn't in that day.  RN assisted with scheduling same-day visit for f/u on 7/19/23.    Joy Escobar RN  [facility]

## 2023-07-19 ENCOUNTER — OFFICE VISIT (OUTPATIENT)
Dept: FAMILY MEDICINE | Facility: CLINIC | Age: 43
End: 2023-07-19
Payer: COMMERCIAL

## 2023-07-19 VITALS
TEMPERATURE: 97.6 F | HEART RATE: 82 BPM | BODY MASS INDEX: 32.66 KG/M2 | SYSTOLIC BLOOD PRESSURE: 102 MMHG | DIASTOLIC BLOOD PRESSURE: 72 MMHG | RESPIRATION RATE: 16 BRPM | WEIGHT: 227.6 LBS | OXYGEN SATURATION: 98 %

## 2023-07-19 DIAGNOSIS — K42.9 UMBILICAL HERNIA WITHOUT OBSTRUCTION AND WITHOUT GANGRENE: Primary | ICD-10-CM

## 2023-07-19 DIAGNOSIS — G89.29 CHRONIC BILATERAL LOW BACK PAIN WITHOUT SCIATICA: ICD-10-CM

## 2023-07-19 DIAGNOSIS — M54.50 CHRONIC BILATERAL LOW BACK PAIN WITHOUT SCIATICA: ICD-10-CM

## 2023-07-19 PROBLEM — E66.01 MORBID OBESITY (H): Status: RESOLVED | Noted: 2018-06-28 | Resolved: 2023-07-19

## 2023-07-19 PROCEDURE — 99214 OFFICE O/P EST MOD 30 MIN: CPT | Performed by: NURSE PRACTITIONER

## 2023-07-19 ASSESSMENT — PATIENT HEALTH QUESTIONNAIRE - PHQ9: SUM OF ALL RESPONSES TO PHQ QUESTIONS 1-9: 5

## 2023-07-19 ASSESSMENT — ANXIETY QUESTIONNAIRES
1. FEELING NERVOUS, ANXIOUS, OR ON EDGE: NOT AT ALL
6. BECOMING EASILY ANNOYED OR IRRITABLE: NOT AT ALL
3. WORRYING TOO MUCH ABOUT DIFFERENT THINGS: NOT AT ALL
IF YOU CHECKED OFF ANY PROBLEMS ON THIS QUESTIONNAIRE, HOW DIFFICULT HAVE THESE PROBLEMS MADE IT FOR YOU TO DO YOUR WORK, TAKE CARE OF THINGS AT HOME, OR GET ALONG WITH OTHER PEOPLE: NOT DIFFICULT AT ALL
7. FEELING AFRAID AS IF SOMETHING AWFUL MIGHT HAPPEN: NOT AT ALL
GAD7 TOTAL SCORE: 0
2. NOT BEING ABLE TO STOP OR CONTROL WORRYING: NOT AT ALL
GAD7 TOTAL SCORE: 0
5. BEING SO RESTLESS THAT IT IS HARD TO SIT STILL: NOT AT ALL
4. TROUBLE RELAXING: NOT AT ALL

## 2023-07-19 ASSESSMENT — PAIN SCALES - GENERAL: PAINLEVEL: NO PAIN (0)

## 2023-07-19 NOTE — PROGRESS NOTES
Assessment & Plan     Umbilical hernia without obstruction and without gangrene  -complains of abdominal discomfort, would like to follow up with general surgeon to discuss surgery   - Adult General Surg Referral; Future    Chronic bilateral low back pain without sciatica  -feeling better today  -recommended physical therapy   -if no improvement will consider MRI and possible LILLIAN   - Physical Therapy Referral; Future      DIONNA Martins CNP  [facility] KARLEY Maher is a 43 year old, presenting for the following health issues:  Back Pain and Referral (Would like a referral to see a surgeon for her hernia )        7/19/2023     8:40 AM   Additional Questions   Roomed by Stefanie     Chief Complaint   Patient presents with    Back Pain    Referral     Would like a referral to see a surgeon for her hernia                8/31/2012     9:00 AM 4/13/2017     2:29 PM 7/19/2023     9:08 AM   PHQ-9 SCORE   PHQ-9 Total Score 1     PHQ-9 Total Score  3 5           8/31/2012     9:52 AM 4/13/2017     2:29 PM 7/19/2023     9:09 AM   CHARU-7 SCORE   Total Score 2     Total Score  9 0           7/19/2023     9:09 AM   PEG Score   PEG Total Score 7.33       PDMP Review         Value Time User    State PDMP site checked  Yes 7/19/2023  9:37 AM Gretel Sharma APRN CNP          Last CSA Agreement:   CSA -- Patient Level:    CSA: None found at the patient level.           Review of Systems   Constitutional, HEENT, cardiovascular, pulmonary, gi and gu systems are negative, except as otherwise noted.      Objective    /72 (BP Location: Left arm, Patient Position: Sitting, Cuff Size: Adult Large)   Pulse 82   Temp 97.6  F (36.4  C) (Tympanic)   Resp 16   Wt 103.2 kg (227 lb 9.6 oz)   LMP 07/01/2023 (Exact Date)   SpO2 98%   BMI 32.66 kg/m    Body mass index is 32.66 kg/m .  Physical Exam   GENERAL: healthy, alert and no distress  MS: no gross musculoskeletal defects noted, no  edema  SKIN: no suspicious lesions or rashes  NEURO: Normal strength and tone, mentation intact and speech normal  PSYCH: mentation appears normal, affect normal/bright

## 2023-08-31 ENCOUNTER — TELEPHONE (OUTPATIENT)
Dept: FAMILY MEDICINE | Facility: CLINIC | Age: 43
End: 2023-08-31
Payer: COMMERCIAL

## 2023-08-31 NOTE — TELEPHONE ENCOUNTER
Patient Quality Outreach    Patient is due for the following:   Cervical Cancer Screening - PAP Needed    Next Steps:   Schedule a Adult Preventative    Type of outreach:    Sent letter.    Next Steps:  Reach out within 90 days via Letter.    Max number of attempts reached: No. Will try again in 90 days if patient still on fail list.    Questions for provider review:    None           Stefanie Montero CMA  Chart routed to none.

## 2023-08-31 NOTE — LETTER
August 31, 2023    To  Gunnar Cheney  [address]  [place] [address]    Your team at [facility] cares about your health. We have reviewed your chart and based on our findings; we are making the following recommendations to better manage your health.     You are in particular need of attention regarding the following:     Schedule a primary care office visit with your provider for a Pap Smear to screen for Cervical Cancer.    If you have already completed these items, please contact the clinic via phone or   [facility] so your care team can review and update your records. Thank you for   choosing [facility] for your healthcare needs. For any questions,   concerns, or to schedule an appointment please contact our clinic.    Healthy Regards,      Your [facility] Care Team

## 2023-11-20 ENCOUNTER — HOSPITAL ENCOUNTER (EMERGENCY)
Facility: CLINIC | Age: 43
Discharge: HOME | End: 2023-11-20
Attending: NURSE PRACTITIONER | Admitting: NURSE PRACTITIONER
Payer: OTHER MISCELLANEOUS

## 2023-11-20 ENCOUNTER — APPOINTMENT (OUTPATIENT)
Dept: GENERAL RADIOLOGY | Facility: CLINIC | Age: 43
End: 2023-11-20
Attending: NURSE PRACTITIONER
Payer: OTHER MISCELLANEOUS

## 2023-11-20 VITALS
DIASTOLIC BLOOD PRESSURE: 107 MMHG | SYSTOLIC BLOOD PRESSURE: 169 MMHG | HEIGHT: 70 IN | HEART RATE: 68 BPM | BODY MASS INDEX: 32.21 KG/M2 | TEMPERATURE: 98.4 F | RESPIRATION RATE: 18 BRPM | OXYGEN SATURATION: 100 % | WEIGHT: 225 LBS

## 2023-11-20 DIAGNOSIS — S46.911A STRAIN OF RIGHT SHOULDER, INITIAL ENCOUNTER: ICD-10-CM

## 2023-11-20 DIAGNOSIS — M25.511 ACUTE PAIN OF RIGHT SHOULDER: ICD-10-CM

## 2023-11-20 LAB
ALBUMIN UR-MCNC: NEGATIVE MG/DL
APPEARANCE UR: CLEAR
BACTERIA #/AREA URNS HPF: ABNORMAL /HPF
BILIRUB UR QL STRIP: NEGATIVE
COLOR UR AUTO: ABNORMAL
GLUCOSE UR STRIP-MCNC: NEGATIVE MG/DL
HGB UR QL STRIP: NEGATIVE
KETONES UR STRIP-MCNC: NEGATIVE MG/DL
LEUKOCYTE ESTERASE UR QL STRIP: ABNORMAL
NITRATE UR QL: NEGATIVE
PH UR STRIP: 7 [PH] (ref 5–7)
RBC URINE: 3 /HPF
SP GR UR STRIP: 1 (ref 1–1.03)
SQUAMOUS EPITHELIAL: <1 /HPF
UROBILINOGEN UR STRIP-MCNC: NORMAL MG/DL
WBC URINE: 7 /HPF

## 2023-11-20 PROCEDURE — G0463 HOSPITAL OUTPT CLINIC VISIT: HCPCS | Performed by: NURSE PRACTITIONER

## 2023-11-20 PROCEDURE — 99213 OFFICE O/P EST LOW 20 MIN: CPT | Performed by: NURSE PRACTITIONER

## 2023-11-20 PROCEDURE — 73030 X-RAY EXAM OF SHOULDER: CPT | Mod: RT

## 2023-11-20 PROCEDURE — 81001 URINALYSIS AUTO W/SCOPE: CPT | Performed by: NURSE PRACTITIONER

## 2023-11-20 PROCEDURE — 81003 URINALYSIS AUTO W/O SCOPE: CPT | Performed by: EMERGENCY MEDICINE

## 2023-11-20 ASSESSMENT — ACTIVITIES OF DAILY LIVING (ADL): ADLS_ACUITY_SCORE: 35
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
## 2023-11-20 NOTE — ED TRIAGE NOTES
Pt presents with right shoulder pain after dragging heavy box of potatos at work. Pt was using a tote hook to drag box. Felt a snap and felt as though it came out of place. Pt took tylenol and naproxen last night. Pain worse this morning and unable to move extremity without severe pain. This will be work comp visit.     Pt also reports urinary frequency and urgency for 2 weeks.      Triage Assessment (Adult)       Row Name 11/20/23 1125          Triage Assessment    Airway WDL WDL        Respiratory WDL    Respiratory WDL WDL        Peripheral/Neurovascular WDL    Peripheral Neurovascular WDL X;neurovascular assessment upper        Cognitive/Neuro/Behavioral WDL    Cognitive/Neuro/Behavioral WDL WDL        RUE Neurovascular Assessment    Temperature RUE warm     Color RUE no discoloration     Sensation RUE numbness present;tingling present

## 2023-11-20 NOTE — DISCHARGE INSTRUCTIONS
No acute fractures, dislocation or abnormalities identified.  Recommend icing, staying mobile, oral over-the-counter naproxen once every 12 hours supplemented with Tylenol as needed per package instructions.  You identified that you did not may have work restrictions in your schedule tomorrow for work 11/21/23.

## 2023-11-20 NOTE — Clinical Note
Gunnar Cheney was seen and treated in our emergency department on 11/20/2023.  She may return to work on 11/21/2023.  Declined work restrictions at this time recommend follow-up with work comp provider if symptoms persist or worsen.     If you have any questions or concerns, please don't hesitate to call.      Flori Myers, APRN CNP Plan: YEARLY SKIN CHECKS Detail Level: Zone

## 2023-11-20 NOTE — Clinical Note
Gunnar Cheney was seen and treated in our emergency department on 11/20/2023.  She may return to work on 11/21/2023.  Declined work restrictions at this time recommend follow-up with work comp provider if symptoms persist or worsen.     If you have any questions or concerns, please don't hesitate to call.      Flori Myers, APRN CNP

## 2023-12-18 DIAGNOSIS — R56.9 CONVULSIONS, UNSPECIFIED CONVULSION TYPE (H): ICD-10-CM

## 2023-12-18 RX ORDER — CARBAMAZEPINE 200 MG/1
600 TABLET ORAL 2 TIMES DAILY
Qty: 293 TABLET | Refills: 0 | OUTPATIENT
Start: 2023-12-18

## 2023-12-18 RX ORDER — CARBAMAZEPINE 200 MG/1
600 TABLET ORAL 2 TIMES DAILY
Qty: 180 TABLET | Refills: 0 | Status: SHIPPED | OUTPATIENT
Start: 2023-12-18 | End: 2024-01-22

## 2023-12-21 ENCOUNTER — TELEPHONE (OUTPATIENT)
Dept: FAMILY MEDICINE | Facility: CLINIC | Age: 43
End: 2023-12-21
Payer: COMMERCIAL

## 2023-12-21 NOTE — LETTER
December 21, 2023    To  Gunnar Cheney  [id number]  [place] [address]    Your team at [facility] cares about your health. We have reviewed your chart and based on our findings; we are making the following recommendations to better manage your health.     You are in particular need of attention regarding the following:     PREVENTATIVE VISIT: Physical    If you have already completed these items, please contact the clinic via phone or   MyChart so your care team can review and update your records. Thank you for   choosing [facility] for your healthcare needs. For any questions,   concerns, or to schedule an appointment please contact our clinic.    Healthy Regards,      Your [facility] Care Team                     Vaccine Information Statement(s) was given today. This has been reviewed, questions answered, and verbal consent given by Patient for injection(s) and administration of Tetanus/Diphtheria (Td)  and Men B        Patient tolerated without incident. See immunization grid for documentation.

## 2024-01-20 DIAGNOSIS — R56.9 CONVULSIONS, UNSPECIFIED CONVULSION TYPE (H): ICD-10-CM

## 2024-01-20 NOTE — LETTER
[facility]  [address]  [place] [address]  [phone]  January 22, 2024    Gunnar Cheney  [address]  [place] [address]    Dear Gunnar,    We care about your health and have reviewed your health plan including your medical conditions, medication list, and lab results.  Based on this review, it is recommended that you follow up regarding the following health topic(s):     -Wellness (Physical) Visit & Medication Refills - Appointment Needed    Please call us at [phone] (or use [facility]) to address the above recommendations.     Thank you for trusting [facility] and we appreciate the opportunity to serve you.  We look forward to supporting your healthcare needs in the future.    Healthy Regards,      Your Health Care Team  [facility]

## 2024-01-22 RX ORDER — CARBAMAZEPINE 200 MG/1
600 TABLET ORAL 2 TIMES DAILY
Qty: 180 TABLET | Refills: 0 | Status: SHIPPED | OUTPATIENT
Start: 2024-01-22 | End: 2024-02-22

## 2024-01-22 NOTE — TELEPHONE ENCOUNTER
Requested Prescriptions   Pending Prescriptions Disp Refills    carBAMazepine (TEGRETOL) 200 MG tablet [Pharmacy Med Name: carBAMazepine 200 MG Oral Tablet] 180 tablet 0     Sig: TAKE 3 TABLETS BY MOUTH TWICE DAILY       Anti-Seizure Meds Protocol  Failed - 1/20/2024 10:21 AM        Failed - Recent (12 mo) or future (30 days) visit within the authorizing provider's specialty     The patient must have completed an in-person or virtual visit within the past 12 months or has a future visit scheduled within the next 90 days with the authorizing provider s specialty.  Urgent care and e-visits do not quality as an office visit for this protocol.          Failed - Review Authorizing provider's last note.      Refer to last progress notes: confirm request is for original authorizing provider (cannot be through other providers).          Passed - Normal CBC on file in past 26 months     Recent Labs   Lab Test 10/14/22  1102 09/12/22  2331   WBC  --  9.1   RBC  --  4.19   HGB 13.7 13.6   HCT  --  40.3   PLT  --  257                 Passed - Normal ALT or AST on file in past 26 months     Recent Labs   Lab Test 09/12/22  2331   ALT 28     Recent Labs   Lab Test 09/12/22  2331   AST 29             Passed - Normal platelet count on file in past 26 months     Recent Labs   Lab Test 09/12/22  2331                  Passed - Carbamazepine level within therapeutic range in last 26 months     No lab results found.    Carbamazepine level must be checked 2-4 weeks after dosage change.            Passed - Medication is active on med list        Passed - No active pregnancy on record        Passed - No positive pregnancy test in last 12 months

## 2024-02-21 DIAGNOSIS — R56.9 CONVULSIONS, UNSPECIFIED CONVULSION TYPE (H): ICD-10-CM

## 2024-02-21 RX ORDER — CARBAMAZEPINE 200 MG/1
600 TABLET ORAL 2 TIMES DAILY
Qty: 180 TABLET | Refills: 0 | OUTPATIENT
Start: 2024-02-21

## 2024-02-22 RX ORDER — CARBAMAZEPINE 200 MG/1
600 TABLET ORAL 2 TIMES DAILY
Qty: 180 TABLET | Refills: 0 | Status: SHIPPED | OUTPATIENT
Start: 2024-02-22 | End: 2024-03-18

## 2024-02-22 NOTE — TELEPHONE ENCOUNTER
Patient calling. Only has one dose left. Patient takes 6 tablets daily. 180 tablets is only a 30 day supply.

## 2024-02-28 ENCOUNTER — OFFICE VISIT (OUTPATIENT)
Dept: FAMILY MEDICINE | Facility: CLINIC | Age: 44
End: 2024-02-28

## 2024-02-28 VITALS
OXYGEN SATURATION: 98 % | RESPIRATION RATE: 16 BRPM | BODY MASS INDEX: 35.4 KG/M2 | WEIGHT: 247.3 LBS | HEIGHT: 70 IN | TEMPERATURE: 98.5 F | DIASTOLIC BLOOD PRESSURE: 80 MMHG | SYSTOLIC BLOOD PRESSURE: 122 MMHG | HEART RATE: 92 BPM

## 2024-02-28 DIAGNOSIS — G89.29 CHRONIC RIGHT SHOULDER PAIN: ICD-10-CM

## 2024-02-28 DIAGNOSIS — M25.511 CHRONIC RIGHT SHOULDER PAIN: ICD-10-CM

## 2024-02-28 DIAGNOSIS — Z01.818 PREOP GENERAL PHYSICAL EXAM: Primary | ICD-10-CM

## 2024-02-28 LAB
ANION GAP SERPL CALCULATED.3IONS-SCNC: 7 MMOL/L (ref 7–15)
BUN SERPL-MCNC: 12.8 MG/DL (ref 6–20)
CALCIUM SERPL-MCNC: 8.6 MG/DL (ref 8.6–10)
CHLORIDE SERPL-SCNC: 106 MMOL/L (ref 98–107)
CREAT SERPL-MCNC: 0.67 MG/DL (ref 0.51–0.95)
DEPRECATED HCO3 PLAS-SCNC: 27 MMOL/L (ref 22–29)
EGFRCR SERPLBLD CKD-EPI 2021: >90 ML/MIN/1.73M2
GLUCOSE SERPL-MCNC: 82 MG/DL (ref 70–99)
HGB BLD-MCNC: 12.7 G/DL (ref 11.7–15.7)
POTASSIUM SERPL-SCNC: 4.5 MMOL/L (ref 3.4–5.3)
SODIUM SERPL-SCNC: 140 MMOL/L (ref 135–145)

## 2024-02-28 PROCEDURE — 36415 COLL VENOUS BLD VENIPUNCTURE: CPT | Performed by: NURSE PRACTITIONER

## 2024-02-28 PROCEDURE — 99214 OFFICE O/P EST MOD 30 MIN: CPT | Performed by: NURSE PRACTITIONER

## 2024-02-28 PROCEDURE — 85018 HEMOGLOBIN: CPT | Performed by: NURSE PRACTITIONER

## 2024-02-28 PROCEDURE — 80048 BASIC METABOLIC PNL TOTAL CA: CPT | Performed by: NURSE PRACTITIONER

## 2024-02-28 ASSESSMENT — PAIN SCALES - GENERAL: PAINLEVEL: NO PAIN (0)
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
## 2024-03-18 DIAGNOSIS — R56.9 CONVULSIONS, UNSPECIFIED CONVULSION TYPE (H): ICD-10-CM

## 2024-03-18 RX ORDER — CARBAMAZEPINE 200 MG/1
600 TABLET ORAL 2 TIMES DAILY
Qty: 180 TABLET | Refills: 0 | Status: SHIPPED | OUTPATIENT
Start: 2024-03-18 | End: 2024-03-25

## 2024-03-18 NOTE — TELEPHONE ENCOUNTER
I have not seen patient in 18 months. She will need an appointment to address the medication.   Medication was not addressed at her pre op in February with Gretel.   DIONNA Yousif CNP

## 2024-03-25 ENCOUNTER — VIRTUAL VISIT (OUTPATIENT)
Dept: FAMILY MEDICINE | Facility: CLINIC | Age: 44
End: 2024-03-25
Payer: COMMERCIAL

## 2024-03-25 DIAGNOSIS — R56.9 CONVULSIONS, UNSPECIFIED CONVULSION TYPE (H): Primary | ICD-10-CM

## 2024-03-25 DIAGNOSIS — K64.4 BLEEDING EXTERNAL HEMORRHOIDS: ICD-10-CM

## 2024-03-25 PROCEDURE — 99213 OFFICE O/P EST LOW 20 MIN: CPT | Mod: 95 | Performed by: NURSE PRACTITIONER

## 2024-03-25 RX ORDER — CARBAMAZEPINE 200 MG/1
600 TABLET ORAL 2 TIMES DAILY
Qty: 180 TABLET | Refills: 0 | Status: SHIPPED | OUTPATIENT
Start: 2024-03-25 | End: 2024-04-02

## 2024-03-25 NOTE — PROGRESS NOTES
"Gunnar is a 43 year old who is being evaluated via a billable video visit.    How would you like to obtain your AVS? MyChart  If the video visit is dropped, the invitation should be resent by: Text to cell phone: [phone]  Will anyone else be joining your video visit? No      Assessment & Plan     Convulsions, unspecified convulsion type (H)  Due for labs and in person visit. Assisted in scheduling appointment. I placed referral to neurology to establish care with a new neurologist for ongoing management of her seizure disorder as she is desiring potential medication change.     Bleeding external hemorrhoids  Referral to general surgery to evaluate potential hemorrhoidectomy due to the chronicity of the lesions and the intermittent but regular bleeding she experiences.     BMI  Estimated body mass index is 35.48 kg/m  as calculated from the following:    Height as of 2/28/24: 1.778 m (5' 10\").    Weight as of 2/28/24: 112.2 kg (247 lb 4.8 oz).       Subjective   Gunnar is a 43 year old, presenting for the following health issues:  Recheck Medication        3/25/2024     1:05 PM   Additional Questions   Roomed by Geeta VALENTINE MA     HPI     Medication Followup of Carbamazepine  Taking Medication as prescribed: yes  Side Effects:  None  Medication Helping Symptoms:  yes    *Feeling well, no concerns. She has been getting the generic form from the pharmacy and states that it makes her feel different. Wondering if she can switch to the extended release without having to see her neurologist. Denies any seizures.     Has persistent bleeding hemorrhoid that is not resolving. It has been present since prior to to weight loss. She has a fiber filled, protein rich diet with variety of foods. Drinks >1 gallon of water per day. Occasional use of preparation H when needed. She denies any itching. Pain will be present and uncomfortable. Reports it will bleed \"for days\".         Review of Systems  Constitutional, HEENT, " cardiovascular, pulmonary, gi and gu systems are negative, except as otherwise noted.      Objective           Vitals:  No vitals were obtained today due to virtual visit.    Physical Exam   GENERAL: alert and no distress  EYES: Eyes grossly normal to inspection.  No discharge or erythema, or obvious scleral/conjunctival abnormalities.  RESP: No audible wheeze, cough, or visible cyanosis.    SKIN: Visible skin clear. No significant rash, abnormal pigmentation or lesions.  NEURO: Cranial nerves grossly intact.  Mentation and speech appropriate for age.  PSYCH: Appropriate affect, tone, and pace of words          Video-Visit Details    Type of service:  Video Visit   Originating Location (pt. Location): Home    Distant Location (provider location):  On-site  Platform used for Video Visit: Satnam  Signed Electronically by: DIONNA Yousif CNP

## 2024-03-27 ENCOUNTER — HOSPITAL ENCOUNTER (EMERGENCY)
Facility: CLINIC | Age: 44
Discharge: HOME | End: 2024-03-27
Attending: EMERGENCY MEDICINE | Admitting: EMERGENCY MEDICINE
Payer: COMMERCIAL

## 2024-03-27 VITALS
TEMPERATURE: 98.4 F | BODY MASS INDEX: 34.36 KG/M2 | HEART RATE: 82 BPM | RESPIRATION RATE: 20 BRPM | HEIGHT: 70 IN | WEIGHT: 240 LBS | OXYGEN SATURATION: 98 % | SYSTOLIC BLOOD PRESSURE: 152 MMHG | DIASTOLIC BLOOD PRESSURE: 98 MMHG

## 2024-03-27 DIAGNOSIS — Z13.220 SCREENING FOR HYPERLIPIDEMIA: ICD-10-CM

## 2024-03-27 DIAGNOSIS — H81.399 EPISODIC PERIPHERAL VERTIGO: ICD-10-CM

## 2024-03-27 LAB
ALBUMIN SERPL BCG-MCNC: 4.2 G/DL (ref 3.5–5.2)
ALP SERPL-CCNC: 86 U/L (ref 40–150)
ALT SERPL W P-5'-P-CCNC: 32 U/L (ref 0–50)
ANION GAP SERPL CALCULATED.3IONS-SCNC: 12 MMOL/L (ref 7–15)
AST SERPL W P-5'-P-CCNC: 25 U/L (ref 0–45)
BASOPHILS # BLD AUTO: 0 10E3/UL (ref 0–0.2)
BASOPHILS NFR BLD AUTO: 1 %
BILIRUB SERPL-MCNC: 0.2 MG/DL
BUN SERPL-MCNC: 16.3 MG/DL (ref 6–20)
CALCIUM SERPL-MCNC: 9 MG/DL (ref 8.6–10)
CHLORIDE SERPL-SCNC: 104 MMOL/L (ref 98–107)
CREAT SERPL-MCNC: 0.59 MG/DL (ref 0.51–0.95)
DEPRECATED HCO3 PLAS-SCNC: 23 MMOL/L (ref 22–29)
EGFRCR SERPLBLD CKD-EPI 2021: >90 ML/MIN/1.73M2
EOSINOPHIL # BLD AUTO: 0.2 10E3/UL (ref 0–0.7)
EOSINOPHIL NFR BLD AUTO: 3 %
ERYTHROCYTE [DISTWIDTH] IN BLOOD BY AUTOMATED COUNT: 13.9 % (ref 10–15)
GLUCOSE SERPL-MCNC: 100 MG/DL (ref 70–99)
HCT VFR BLD AUTO: 42.8 % (ref 35–47)
HGB BLD-MCNC: 14.3 G/DL (ref 11.7–15.7)
IMM GRANULOCYTES # BLD: 0 10E3/UL
IMM GRANULOCYTES NFR BLD: 0 %
LYMPHOCYTES # BLD AUTO: 1.9 10E3/UL (ref 0.8–5.3)
LYMPHOCYTES NFR BLD AUTO: 29 %
MCH RBC QN AUTO: 31.7 PG (ref 26.5–33)
MCHC RBC AUTO-ENTMCNC: 33.4 G/DL (ref 31.5–36.5)
MCV RBC AUTO: 95 FL (ref 78–100)
MONOCYTES # BLD AUTO: 0.5 10E3/UL (ref 0–1.3)
MONOCYTES NFR BLD AUTO: 7 %
NEUTROPHILS # BLD AUTO: 4.1 10E3/UL (ref 1.6–8.3)
NEUTROPHILS NFR BLD AUTO: 61 %
NRBC # BLD AUTO: 0 10E3/UL
NRBC BLD AUTO-RTO: 0 /100
PLATELET # BLD AUTO: 308 10E3/UL (ref 150–450)
POTASSIUM SERPL-SCNC: 4.5 MMOL/L (ref 3.4–5.3)
PROT SERPL-MCNC: 7.2 G/DL (ref 6.4–8.3)
RBC # BLD AUTO: 4.51 10E6/UL (ref 3.8–5.2)
SODIUM SERPL-SCNC: 139 MMOL/L (ref 135–145)
TSH SERPL DL<=0.005 MIU/L-ACNC: 0.88 UIU/ML (ref 0.3–4.2)
WBC # BLD AUTO: 6.7 10E3/UL (ref 4–11)

## 2024-03-27 PROCEDURE — 99283 EMERGENCY DEPT VISIT LOW MDM: CPT | Performed by: EMERGENCY MEDICINE

## 2024-03-27 PROCEDURE — 99284 EMERGENCY DEPT VISIT MOD MDM: CPT | Performed by: EMERGENCY MEDICINE

## 2024-03-27 PROCEDURE — 250N000013 HC RX MED GY IP 250 OP 250 PS 637: Performed by: EMERGENCY MEDICINE

## 2024-03-27 PROCEDURE — 85025 COMPLETE CBC W/AUTO DIFF WBC: CPT | Performed by: EMERGENCY MEDICINE

## 2024-03-27 PROCEDURE — 80053 COMPREHEN METABOLIC PANEL: CPT | Performed by: EMERGENCY MEDICINE

## 2024-03-27 PROCEDURE — 84443 ASSAY THYROID STIM HORMONE: CPT | Performed by: EMERGENCY MEDICINE

## 2024-03-27 PROCEDURE — 80156 ASSAY CARBAMAZEPINE TOTAL: CPT | Performed by: EMERGENCY MEDICINE

## 2024-03-27 PROCEDURE — 36415 COLL VENOUS BLD VENIPUNCTURE: CPT | Performed by: EMERGENCY MEDICINE

## 2024-03-27 PROCEDURE — 82465 ASSAY BLD/SERUM CHOLESTEROL: CPT

## 2024-03-27 RX ORDER — MECLIZINE HYDROCHLORIDE 25 MG/1
25 TABLET ORAL ONCE
Status: COMPLETED | OUTPATIENT
Start: 2024-03-27 | End: 2024-03-27

## 2024-03-27 RX ORDER — MECLIZINE HYDROCHLORIDE 25 MG/1
25 TABLET ORAL 3 TIMES DAILY PRN
Qty: 30 TABLET | Refills: 0 | Status: SHIPPED | OUTPATIENT
Start: 2024-03-27

## 2024-03-27 RX ADMIN — MECLIZINE HYDROCHLORIDE 25 MG: 25 TABLET ORAL at 17:44

## 2024-03-27 ASSESSMENT — COLUMBIA-SUICIDE SEVERITY RATING SCALE - C-SSRS
6. HAVE YOU EVER DONE ANYTHING, STARTED TO DO ANYTHING, OR PREPARED TO DO ANYTHING TO END YOUR LIFE?: NO
1. IN THE PAST MONTH, HAVE YOU WISHED YOU WERE DEAD OR WISHED YOU COULD GO TO SLEEP AND NOT WAKE UP?: NO
2. HAVE YOU ACTUALLY HAD ANY THOUGHTS OF KILLING YOURSELF IN THE PAST MONTH?: NO

## 2024-03-27 ASSESSMENT — ACTIVITIES OF DAILY LIVING (ADL)
ADLS_ACUITY_SCORE: 35
ADLS_ACUITY_SCORE: 35

## 2024-03-27 NOTE — ED TRIAGE NOTES
Dizziness that started Sunday worse each day prompting visit today she is on a new diet      Triage Assessment (Adult)       Row Name 03/27/24 1525          Triage Assessment    Airway WDL WDL        Respiratory WDL    Respiratory WDL WDL        Skin Circulation/Temperature WDL    Skin Circulation/Temperature WDL WDL        Cardiac WDL    Cardiac WDL WDL        Peripheral/Neurovascular WDL    Peripheral Neurovascular WDL WDL        Cognitive/Neuro/Behavioral WDL    Cognitive/Neuro/Behavioral WDL WDL
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
## 2024-03-27 NOTE — DISCHARGE INSTRUCTIONS
Take meclazine as prescribed for symptoms of dizziness. If your symptoms do not improve then I recommend follow up in the Vestibular clinic. If your symptoms worsen or you develop new or concerning symptoms, please return to the Emergency Department for further evaluation and treatment.  Otherwise follow up in clinic as needed.

## 2024-03-28 LAB — CARBAMAZEPINE SERPL-MCNC: 5.3 UG/ML (ref 4–12)

## 2024-03-29 ENCOUNTER — THERAPY VISIT (OUTPATIENT)
Dept: PHYSICAL THERAPY | Facility: CLINIC | Age: 44
End: 2024-03-29
Attending: EMERGENCY MEDICINE
Payer: COMMERCIAL

## 2024-03-29 DIAGNOSIS — H81.399 EPISODIC PERIPHERAL VERTIGO: ICD-10-CM

## 2024-03-29 DIAGNOSIS — G89.29 CHRONIC BILATERAL LOW BACK PAIN WITHOUT SCIATICA: ICD-10-CM

## 2024-03-29 DIAGNOSIS — M54.50 CHRONIC BILATERAL LOW BACK PAIN WITHOUT SCIATICA: ICD-10-CM

## 2024-03-29 PROCEDURE — 95992 CANALITH REPOSITIONING PROC: CPT | Mod: GP | Performed by: PHYSICAL THERAPIST

## 2024-03-29 PROCEDURE — 97161 PT EVAL LOW COMPLEX 20 MIN: CPT | Mod: GP | Performed by: PHYSICAL THERAPIST

## 2024-03-29 NOTE — PROGRESS NOTES
PHYSICAL THERAPY EVALUATION  Type of Visit: Evaluation    See electronic medical record for Abuse and Falls Screening details.    Subjective       Presenting condition or subjective complaint:    Date of onset: 03/24/24    Relevant medical history:     Dates & types of surgery:      Prior diagnostic imaging/testing results:       Prior therapy history for the same diagnosis, illness or injury:        Prior Level of Function  Transfers:   Ambulation:   ADL:   IADL:     Living Environment  Social support:     Type of home:     Stairs to enter the home:         Ramp:     Stairs inside the home:         Help at home:    Equipment owned:       Employment:       Hobbies/Interests:   hiking    Patient goals for therapy:   get rid of dizziness    Pain assessment:      Objective   OBSERVATION: pt very anxious about getting into positions today     GAIT:   Level of [place]:   Assistive Device(s):   Gait Deviations: WNL  Gait Distance:   Stairs:     BALANCE:     SPECIAL TESTS  Functional Gait Assessment (FGA)      10 Meter Walk Test (Comfortable)     10 Meter Walk Test (Fast)     6 Minute Walk Test (6MWT)           Sen Balance Scale (BBS)     5 Times Sit-to-Stand (5TSTS)       Dynamic Gait Index (DGI)     Timed Up and Go (TUG) - sec    Single Leg Stance Right (sec)    Single Leg Stance Left (sec)    Modified CTSIB Conditions (sec) Cond 1:   Cond 2:   Cond 4:   Cond 5 :    Romberg  (sec)    Sharpened Romberg (sec)    30 Second Sit to Stand (reps/height)            SENSATION:     REFLEXES:   COORDINATION:        VESTIBULAR EVALUATION  ADDITIONAL HISTORY:  Description of symptoms:  Sunday night started getting symptoms. Got up and feel right back down due to heavy dizzy feeling.  Positional changes are the worst.  Laying on the right is the worst. Tuesday got consistent dizziness   Dizzy attacks:   Start:  this past Sunday    Last attack:  constant   Frequency of occurrences:  constant and with positional changes to the  right    Length of attack:  constant   Difficulty hearing:  none  Noise in ears?    none  Alleviates symptoms:  driving   Worsens symptoms:  laying on right side, laying back   Activities that bring on symptoms:  laying down.        Pertinent visual history: none  Pertinent history of current vestibular problem:   DHI:      Cervicogenic Screen    Neck ROM Right rotation (mild wooziness), L WNL,  flexion/ext (increased wooziness),  SB R (increased wooziness)   Vertebral Artery Test Normal   Alar Ligament Test    Transverse Ligament Test    Distraction    Neck Torsion Test (head still, body rotating)    Neck Torsion Test (head and body rotating)         Oculomotor Screen    Ocular ROM Normal   Smooth Pursuit Normal   Saccades Mild dizziness with horizontal saccades but good movement    VOR Normal   VOR Cancellation Normal   Head Impulse Test Normal   Convergence Testing 14 inches from nose, no convergence         Infrared Goggle Exam Vestibular Suppressant in Last 24 Hours? Yes  Exam Completed With: Room light   Spontaneous Nystagmus Negative   Gaze Evoked Nystagmus    Head Shake Horizontal Nystagmus    Positional Testing    Supine Head-Hanging Test     Left Right   Jareth-Hallpike  Upbeating R torsional   Sidelying Test     HSCC Supine Roll Test     HSCC Forward Roll Test     [place] and Lean Test -  Sitting Erect    [place] and Lean Test - Seated, Head Bent 60 Degrees Forward    [place] and Lean Test - Seated, Head Bent Backwards       BPPV Canal(s): R Posterior  BPPV Type: Canalithasis    Dynamic Visual Acuity (DVA)    Static Acuity (LogMar)    Horizontal Head Movement at 1 Hz (LogMar)    Horizontal Head Movement at 2 Hz (LogMar)               Assessment & Plan   CLINICAL IMPRESSIONS  Medical Diagnosis: Episodic peripheral vertigo    Treatment Diagnosis: BPPV R canalithiasis   Impression/Assessment: Patient is a 43 year old female with right BPPV canalithiasis complaints.  The following significant findings have been identified:  Impaired gait, Dizziness, and Disequilibrium . These impairments interfere with their ability to perform self care tasks, work tasks, recreational activities, household mobility, and community mobility as compared to previous level of function.     Clinical Decision Making (Complexity):  Clinical Presentation: Stable/Uncomplicated  Clinical Presentation Rationale: based on medical and personal factors listed in PT evaluation  Clinical Decision Making (Complexity): Low complexity    PLAN OF CARE  Treatment Interventions:  Interventions: Neuromuscular Re-education, Therapeutic Activity, Self-Care/Home Management, Canalith Repositioning    Long Term Goals     PT Goal 1  Goal Identifier: 1  Goal Description: Patient will be able to roll in bed either direction with no symptoms of dizziness in order to improve bed mobility  Target Date: 04/12/24  PT Goal 2  Goal Identifier: 2  Goal Description: Patient will be able to ambulate with no LOB or sway in order to improve community ambulation.  Target Date: 04/26/24  PT Goal 3  Goal Identifier: 3  Goal Description: Patient will report no dizziness with ADL's in order to return to previous level of function with improved safety.  Target Date: 04/26/24      Frequency of Treatment: 1x/week  Duration of Treatment: 4 weeks    Recommended Referrals to Other Professionals:   Education Assessment:   Learner/Method: Patient  Education Comments: educated on role of P and POC    Risks and benefits of evaluation/treatment have been explained.   Patient/Family/caregiver agrees with Plan of Care.     Evaluation Time:     PT Eval, Low Complexity Minutes (09817): 21       Signing Clinician: Jasmine Patel, PT

## 2024-04-02 ENCOUNTER — OFFICE VISIT (OUTPATIENT)
Dept: FAMILY MEDICINE | Facility: CLINIC | Age: 44
End: 2024-04-02
Payer: COMMERCIAL

## 2024-04-02 VITALS
RESPIRATION RATE: 16 BRPM | OXYGEN SATURATION: 98 % | HEART RATE: 88 BPM | TEMPERATURE: 97.4 F | DIASTOLIC BLOOD PRESSURE: 74 MMHG | WEIGHT: 243 LBS | BODY MASS INDEX: 34.87 KG/M2 | SYSTOLIC BLOOD PRESSURE: 126 MMHG

## 2024-04-02 DIAGNOSIS — Z13.220 SCREENING FOR HYPERLIPIDEMIA: ICD-10-CM

## 2024-04-02 DIAGNOSIS — R56.9 CONVULSIONS, UNSPECIFIED CONVULSION TYPE (H): ICD-10-CM

## 2024-04-02 DIAGNOSIS — Z12.31 VISIT FOR SCREENING MAMMOGRAM: Primary | ICD-10-CM

## 2024-04-02 DIAGNOSIS — Z00.00 ROUTINE GENERAL MEDICAL EXAMINATION AT A HEALTH CARE FACILITY: ICD-10-CM

## 2024-04-02 DIAGNOSIS — Z12.4 CERVICAL CANCER SCREENING: ICD-10-CM

## 2024-04-02 DIAGNOSIS — Z11.3 ROUTINE SCREENING FOR STI (SEXUALLY TRANSMITTED INFECTION): ICD-10-CM

## 2024-04-02 LAB
CHOLEST SERPL-MCNC: 303 MG/DL
HDLC SERPL-MCNC: 82 MG/DL
LDLC SERPL CALC-MCNC: 202 MG/DL
NONHDLC SERPL-MCNC: 221 MG/DL
TRIGL SERPL-MCNC: 95 MG/DL

## 2024-04-02 PROCEDURE — 87624 HPV HI-RISK TYP POOLED RSLT: CPT | Performed by: NURSE PRACTITIONER

## 2024-04-02 PROCEDURE — 99396 PREV VISIT EST AGE 40-64: CPT | Mod: 25 | Performed by: NURSE PRACTITIONER

## 2024-04-02 PROCEDURE — 99213 OFFICE O/P EST LOW 20 MIN: CPT | Mod: 25 | Performed by: NURSE PRACTITIONER

## 2024-04-02 PROCEDURE — 87591 N.GONORRHOEAE DNA AMP PROB: CPT | Performed by: NURSE PRACTITIONER

## 2024-04-02 PROCEDURE — G0145 SCR C/V CYTO,THINLAYER,RESCR: HCPCS | Performed by: NURSE PRACTITIONER

## 2024-04-02 PROCEDURE — 87491 CHLMYD TRACH DNA AMP PROBE: CPT | Performed by: NURSE PRACTITIONER

## 2024-04-02 RX ORDER — CARBAMAZEPINE 200 MG/1
600 TABLET ORAL 2 TIMES DAILY
Qty: 540 TABLET | Refills: 3 | Status: SHIPPED | OUTPATIENT
Start: 2024-04-02

## 2024-04-02 SDOH — HEALTH STABILITY: PHYSICAL HEALTH: ON AVERAGE, HOW MANY DAYS PER WEEK DO YOU ENGAGE IN MODERATE TO STRENUOUS EXERCISE (LIKE A BRISK WALK)?: 4 DAYS

## 2024-04-02 ASSESSMENT — PAIN SCALES - GENERAL: PAINLEVEL: NO PAIN (0)

## 2024-04-02 ASSESSMENT — SOCIAL DETERMINANTS OF HEALTH (SDOH): HOW OFTEN DO YOU GET TOGETHER WITH FRIENDS OR RELATIVES?: TWICE A WEEK

## 2024-04-02 NOTE — PROGRESS NOTES
Preventive Care Visit  [facility]  Kati Read, APRN CNP, Nurse Practitioner - Family  Apr 2, 2024      Assessment & Plan     Routine general medical examination at a health care facility  Healthy female exam completed today. Discussed lifestyle modifications including weight loss/ management, eating a balanced diet, and getting regular cardiovascular exercise. Discussed self breast exams and how to complete these. Pap Smear updated today. Labs completed today. Plan yearly annual physicals or sooner as needed.      Convulsions, unspecified convulsion type (H)  Seizure free since 2009, reviewed labs completed in ER tegretol level is stable. She is establishing care with a new neurologist this year.   - carBAMazepine (TEGRETOL) 200 MG tablet; Take 3 tablets (600 mg) by mouth 2 times daily LAST FILL    Cervical cancer screening  - Pap Screen with HPV - recommended age 30 - 65 years    Screening for hyperlipidemia  - Lipid panel reflex to direct LDL Non-fasting; Future    Routine screening for STI (sexually transmitted infection)  - Chlamydia trachomatis/Neisseria gonorrhoeae by PCR - Clinic Collect    Visit for screening mammogram  - MA SCREENING DIGITAL BILAT - Future  (s+30); Future    Patient has been advised of split billing requirements and indicates understanding: Yes      Counseling  Appropriate preventive services were discussed with this patient, including applicable screening as appropriate for fall prevention, nutrition, physical activity, Tobacco-use cessation, weight loss and cognition.  Checklist reviewing preventive services available has been given to the patient.  Reviewed patient's diet, addressing concerns and/or questions.   The patient was instructed to see the dentist every 6 months.   She is at risk for psychosocial distress and has been provided with information to reduce risk.         Brenton Maher is a 43 year old, presenting for the  following:  Physical        4/2/2024     8:53 AM   Additional Questions   Roomed by Geeta VALENTINE MA        Health Care Directive  Patient does not have a Health Care Directive or Living Will: Discussed advance care planning with patient; however, patient declined at this time.    HPI    *General health catch up since she has insurance again.     Has not had any seizure since 2009. Stable doing well on tegretol. She will re-establish with neurology this year.           4/2/2024   General Health   How would you rate your overall physical health? Good   Feel stress (tense, anxious, or unable to sleep) Only a little   (!) STRESS CONCERN      4/2/2024   Nutrition   Three or more servings of calcium each day? Yes   Diet: Carbohydrate counting    Gluten-free/reduced   How many servings of fruit and vegetables per day? 4 or more   How many sweetened beverages each day? 0-1         4/2/2024   Exercise   Days per week of moderate/strenous exercise 4 days         4/2/2024   Social Factors   Frequency of gathering with friends or relatives Twice a week   Worry food won't last until get money to buy more No   Food not last or not have enough money for food? No   Do you have housing?  Yes   Are you worried about losing your housing? No   Lack of transportation? No   Unable to get utilities (heat,electricity)? No         4/2/2024   Dental   Dentist two times every year? (!) NO         4/2/2024   TB Screening   Were you born outside of the [place]? Yes           Today's PHQ-2 Score:       2/28/2024    10:13 AM   PHQ-2 ( 1999 [employer])   Q1: Little interest or pleasure in doing things 0   Q2: Feeling down, depressed or hopeless 0   PHQ-2 Score 0   Q1: Little interest or pleasure in doing things Not at all   Q2: Feeling down, depressed or hopeless Not at all   PHQ-2 Score 0         4/2/2024   Substance Use   If I could quit smoking, I would Somewhat agree   I want to quit somking, worry about health affects Completely agree   Willing to make a  plan to quit smoking Somewhat agree   Willing to cut down before quitting Completely agree   Alcohol more than 3/day or more than 7/wk No   Do you use any other substances recreationally? No     Social History     Tobacco Use    Smoking status: Every Day     Packs/day: 0.50     Years: 10.00     Additional pack years: 0.00     Total pack years: 5.00     Types: Cigarettes     Start date: 2015    Smokeless tobacco: Never    Tobacco comments:     3-4 ciggs per day   Vaping Use    Vaping Use: Never used   Substance Use Topics    Alcohol use: Not Currently     Comment: occ    Drug use: No          Mammogram Screening - Mammogram every 1-2 years updated in Health Maintenance based on mutual decision making        2024   STI Screening   New sexual partner(s) since last STI/HIV test? (!) YES      History of abnormal Pap smear: NO - age 30-65 PAP every 5 years with negative HPV co-testing recommended        Latest Ref Rng & Units 2017     2:48 PM 2017     2:40 PM 2011    10:19 AM   PAP / HPV   PAP (Historical)  ASC-US   NIL    HPV 16 DNA NEG  Negative     HPV 18 DNA NEG  Negative     Other HR HPV NEG  Negative       ASCVD Risk   The ASCVD Risk score (Geoffrey HOWELL, et al., 2019) failed to calculate for the following reasons:    Cannot find a previous HDL lab    Cannot find a previous total cholesterol lab       Reviewed and updated as needed this visit by Provider   Tobacco  Allergies  Meds  Problems  Med Hx  Surg Hx  Fam Hx            Past Medical History:   Diagnosis Date    ASCUS of cervix with negative high risk HPV 2017    Convulsion (H)     Transient hypertension of pregnancy, antepartum 2006    Currently reponding well to bed rest. All labs negative    Urinary tract infection, site not specified      Past Surgical History:   Procedure Laterality Date    C/SECTION, CLASSICAL  2006    , Classical    C/SECTION, CLASSICAL      , Classical     TONSILLECTOMY & ADENOIDECTOMY  1991    TUBAL LIGATION  2006     OB History    Para Term  AB Living   3 3 3 0 0 3   SAB IAB Ectopic Multiple Live Births   0 0 0 0 3      # Outcome Date GA Lbr Aren/2nd Weight Sex Delivery Anes PTL Lv   3 Term 06 38w0d  4.111 kg (9 lb 1 oz) M CS SPINAL  CRYS      Birth Comments:  - repeat. Vacuum      Name: Cosme      Apgar1: 3  Apgar5: 7   2 Term 03 40w0d 06:00 3.856 kg (8 lb 8 oz) F CS   CRYS      Birth Comments: pushed x 6 hours - -infant jaundice   1 Term 01 37w0d 03:00 2.58 kg (5 lb 11 oz) F    CRYS      Birth Comments: Preeclampsia     Lab work is in process  Labs reviewed in EPIC  BP Readings from Last 3 Encounters:   24 126/74   24 (!) 152/98   24 122/80    Wt Readings from Last 3 Encounters:   24 110.2 kg (243 lb)   24 108.9 kg (240 lb)   24 112.2 kg (247 lb 4.8 oz)                  Patient Active Problem List   Diagnosis    Convulsions (H)    Generalized anxiety disorder    Obesity    CARDIOVASCULAR SCREENING; LDL GOAL LESS THAN 160    Shoulder instability    Tobacco abuse    Self-injurious behavior    Health Care Home    ASCUS of cervix with negative high risk HPV    Episodic peripheral vertigo     Past Surgical History:   Procedure Laterality Date    C/SECTION, CLASSICAL  2006    , Classical    C/SECTION, CLASSICAL      , Classical    TONSILLECTOMY & ADENOIDECTOMY  1991    TUBAL LIGATION  2006       Social History     Tobacco Use    Smoking status: Every Day     Packs/day: 0.50     Years: 10.00     Additional pack years: 0.00     Total pack years: 5.00     Types: Cigarettes     Start date: 2015    Smokeless tobacco: Never    Tobacco comments:     3-4 ciggs per day   Substance Use Topics    Alcohol use: Not Currently     Comment: occ     Family History   Problem Relation Age of Onset    Hypertension Father     Depression Father      Alcohol/Drug Father     Hypertension Paternal Grandmother     Depression Paternal Grandmother     Alcohol/Drug Paternal Grandmother     Psychotic Disorder Paternal Grandmother     Hypertension Paternal Grandfather     Depression Paternal Grandfather     Cancer Paternal Grandfather         cancer around his bile duct.    Alcohol/Drug Mother     Depression Mother     Scoliosis Mother     Alcohol/Drug Brother     Depression Brother     Psychotic Disorder Brother     Asthma No family hx of     C.A.D. No family hx of     Diabetes No family hx of     Cerebrovascular Disease No family hx of     Breast Cancer No family hx of     Cancer - colorectal No family hx of     Prostate Cancer No family hx of          Current Outpatient Medications   Medication Sig Dispense Refill    carBAMazepine (TEGRETOL) 200 MG tablet Take 3 tablets (600 mg) by mouth 2 times daily LAST FILL 540 tablet 3    meclizine (ANTIVERT) 25 MG tablet Take 1 tablet (25 mg) by mouth 3 times daily as needed for dizziness 30 tablet 0     Allergies   Allergen Reactions    Shrimp Anaphylaxis    Nkda [No Known Drug Allergy]      Recent Labs   Lab Test 03/27/24  1704 02/28/24  1031 10/14/22  1102 09/12/22  2331 07/23/21  1736 07/23/21  1736 07/23/20  1538 05/02/18  0857   LDL  --   --   --   --   --   --   --  140*   HDL  --   --   --   --   --   --   --  52   TRIG  --   --   --   --   --   --   --  110   ALT 32  --   --  28  --  20 24  --    CR 0.59 0.67   < > 0.65   < > 0.76 0.69  --    GFRESTIMATED >90 >90   < > >90   < > >90 >90  --    GFRESTBLACK  --   --   --   --   --   --  >90  --    POTASSIUM 4.5 4.5  --  3.6   < > 3.8 3.9  --    TSH 0.88  --   --   --   --   --   --   --     < > = values in this interval not displayed.          Review of Systems  Constitutional, neuro, ENT, endocrine, pulmonary, cardiac, gastrointestinal, genitourinary, musculoskeletal, integument and psychiatric systems are negative, except as otherwise noted.     Objective   "  Exam  /74   Pulse 88   Temp 97.4  F (36.3  C) (Tympanic)   Resp 16   Wt 110.2 kg (243 lb)   LMP 03/23/2024 (Exact Date)   SpO2 98%   BMI 34.87 kg/m     Estimated body mass index is 34.87 kg/m  as calculated from the following:    Height as of 3/27/24: 1.778 m (5' 10\").    Weight as of this encounter: 110.2 kg (243 lb).    Physical Exam  GENERAL: alert and no distress  EYES: Eyes grossly normal to inspection, PERRL and conjunctivae and sclerae normal  HENT: ear canals and TM's normal, nose and mouth without ulcers or lesions  NECK: no adenopathy, no asymmetry, masses, or scars  RESP: lungs clear to auscultation - no rales, rhonchi or wheezes  BREAST: normal without masses, tenderness or nipple discharge and no palpable axillary masses or adenopathy  CV: regular rate and rhythm, normal S1 S2, no S3 or S4, no murmur, click or rub, no peripheral edema  ABDOMEN: soft, nontender, no hepatosplenomegaly, no masses and bowel sounds normal   (female): normal female external genitalia, normal urethral meatus, normal vaginal mucosa. PAP completed today.   MS: no gross musculoskeletal defects noted, no edema  SKIN: no suspicious lesions or rashes  NEURO: Normal strength and tone, mentation intact and speech normal  PSYCH: mentation appears normal, affect normal/bright      Signed Electronically by: DIONNA Yousif CNP    "

## 2024-04-02 NOTE — PATIENT INSTRUCTIONS
At home BPPV maneuver: look up on youtube  BPPV half somersault maneuver. Dr. Lauren Díaz.     Neurology: [phone]        Preventive Care Advice   This is general advice given by our system to help you stay healthy. However, your care team may have specific advice just for you. Please talk to your care team about your preventive care needs.  Nutrition  Eat 5 or more servings of fruits and vegetables each day.  Try wheat bread, brown rice and whole grain pasta (instead of white bread, rice, and pasta).  Get enough calcium and vitamin D. Check the label on foods and aim for 100% of the RDA (recommended daily allowance).  Lifestyle  Exercise at least 150 minutes each week   (30 minutes a day, 5 days a week).  Do muscle strengthening activities 2 days a week. These help control your weight and prevent disease.  No smoking.  Wear sunscreen to prevent skin cancer.  Have a dental exam and cleaning every 6 months.  Yearly exams  See your health care team every year to talk about:  Any changes in your health.  Any medicines your care team has prescribed.  Preventive care, family planning, and ways to prevent chronic diseases.  Shots (vaccines)   HPV shots (up to age 26), if you've never had them before.  Hepatitis B shots (up to age 59), if you've never had them before.  COVID-19 shot: Get this shot when it's due.  Flu shot: Get a flu shot every year.  Tetanus shot: Get a tetanus shot every 10 years.  Pneumococcal, hepatitis A, and RSV shots: Ask your care team if you need these based on your risk.  Shingles shot (for age 50 and up).  General health tests  Diabetes screening:  Starting at age 35, Get screened for diabetes at least every 3 years.  If you are younger than age 35, ask your care team if you should be screened for diabetes.  Cholesterol test: At age 39, start having a cholesterol test every 5 years, or more often if advised.  Bone density scan (DEXA): At age 50, ask your care team if you should have this  scan for osteoporosis (brittle bones).  Hepatitis C: Get tested at least once in your life.  STIs (sexually transmitted infections)  Before age 24: Ask your care team if you should be screened for STIs.  After age 24: Get screened for STIs if you're at risk. You are at risk for STIs (including HIV) if:  You are sexually active with more than one person.  You don't use condoms every time.  You or a partner was diagnosed with a sexually transmitted infection.  If you are at risk for HIV, ask about PrEP medicine to prevent HIV.  Get tested for HIV at least once in your life, whether you are at risk for HIV or not.  Cancer screening tests  Cervical cancer screening: If you have a cervix, begin getting regular cervical cancer screening tests at age 21. Most people who have regular screenings with normal results can stop after age 65. Talk about this with your provider.  Breast cancer scan (mammogram): If you've ever had breasts, begin having regular mammograms starting at age 40. This is a scan to check for breast cancer.  Colon cancer screening: It is important to start screening for colon cancer at age 45.  Have a colonoscopy test every 10 years (or more often if you're at risk) Or, ask your provider about stool tests like a FIT test every year or Cologuard test every 3 years.  To learn more about your testing options, visit: https://[email or web address]/369940.pdf.  For help making a decision, visit: https://bit.ly/df35726.  Prostate cancer screening test: If you have a prostate and are age 55 to 69, ask your provider if you would benefit from a yearly prostate cancer screening test.  Lung cancer screening: If you are a current or former smoker age 50 to 80, ask your care team if ongoing lung cancer screenings are right for you.  For informational purposes only. Not to replace the advice of your health care provider. Copyright   2023 [employer]. All rights reserved. Clinically reviewed by the [facility]. [employer] 599621 - REV 01/24.    Learning About Stress  What is stress?     Stress is your body's response to a hard situation. Your body can have a physical, emotional, or mental response. Stress is a fact of life for most people, and it affects everyone differently. What causes stress for you may not be stressful for someone else.  A lot of things can cause stress. You may feel stress when you go on a job interview, take a test, or run a race. This kind of short-term stress is normal and even useful. It can help you if you need to work hard or react quickly. For example, stress can help you finish an important job on time.  Long-term stress is caused by ongoing stressful situations or events. Examples of long-term stress include long-term health problems, ongoing problems at work, or conflicts in your family. Long-term stress can harm your health.  How does stress affect your health?  When you are stressed, your body responds as though you are in danger. It makes hormones that speed up your heart, make you breathe faster, and give you a burst of energy. This is called the fight-or-flight stress response. If the stress is over quickly, your body goes back to normal and no harm is done.  But if stress happens too often or lasts too long, it can have bad effects. Long-term stress can make you more likely to get sick, and it can make symptoms of some diseases worse. If you tense up when you are stressed, you may develop neck, shoulder, or low back pain. Stress is linked to high blood pressure and heart disease.  Stress also harms your emotional health. It can make you lee, tense, or depressed. Your relationships may suffer, and you may not do well at work or school.  What can you do to manage stress?  You can try these things to help manage stress:   Do something active. Exercise or activity can help reduce stress. Walking is a great way to get started. Even everyday activities such as  housecleaning or yard work can help.  Try yoga or halley chi. These techniques combine exercise and meditation. You may need some training at first to learn them.  Do something you enjoy. For example, listen to music or go to a movie. Practice your hobby or do volunteer work.  Meditate. This can help you relax, because you are not worrying about what happened before or what may happen in the future.  Do guided imagery. Imagine yourself in any setting that helps you feel calm. You can use online videos, books, or a teacher to guide you.  Do breathing exercises. For example:  From a standing position, bend forward from the waist with your knees slightly bent. Let your arms dangle close to the floor.  Breathe in slowly and deeply as you return to a standing position. Roll up slowly and lift your head last.  Hold your breath for just a few seconds in the standing position.  Breathe out slowly and bend forward from the waist.  Let your feelings out. Talk, laugh, cry, and express anger when you need to. Talking with supportive friends or family, a counselor, or a sae leader about your feelings is a healthy way to relieve stress. Avoid discussing your feelings with people who make you feel worse.  Write. It may help to write about things that are bothering you. This helps you find out how much stress you feel and what is causing it. When you know this, you can find better ways to cope.  What can you do to prevent stress?  You might try some of these things to help prevent stress:  Manage your time. This helps you find time to do the things you want and need to do.  Get enough sleep. Your body recovers from the stresses of the day while you are sleeping.  Get support. Your family, friends, and community can make a difference in how you experience stress.  Limit your news feed. Avoid or limit time on social media or news that may make you feel stressed.  Do something active. Exercise or activity can help reduce stress.  "Walking is a great way to get started.  Where can you learn more?  Go to https://www.INgrooves.net/patiented  Enter N032 in the search box to learn more about \"Learning About Stress.\"  Current as of: October 24, 2023               Content Version: 14.0    7302-5343 [employer].   Care instructions adapted under license by your healthcare professional. If you have questions about a medical condition or this instruction, always ask your healthcare professional. [employer] disclaims any warranty or liability for your use of this information.      Safer Sex: Care Instructions  Overview  Safer sex is a way to reduce your risk of getting a sexually transmitted infection (STI). It can also help prevent pregnancy.  Several products can help you practice safer sex and reduce your chance of STIs. One of the best is a condom. There are internal and external condoms. You can use a special rubber sheet (dental dam) for protection during oral sex. Disposable gloves can keep your hands from touching blood, semen, or other body fluids that can carry infections.  Remember that birth control methods such as diaphragms, IUDs, foams, and birth control pills do not stop you from getting STIs.  Follow-up care is a key part of your treatment and safety. Be sure to make and go to all appointments, and call your doctor if you are having problems. It's also a good idea to know your test results and keep a list of the medicines you take.  How can you care for yourself at home?  Think about getting vaccinated to help prevent hepatitis A, hepatitis B, and human papillomavirus (HPV). They can be spread through sex.  Use a condom every time you have sex. Use an external condom, which goes on the penis. Or use an internal condom, which goes into the vagina or anus.  Make sure you use the right size external condom. A condom that's too small can break easily. A condom that's too big can slip off during sex.  Use a new " "condom each time you have sex. Be careful not to poke a hole in the condom when you open the wrapper.  Don't use an internal condom and an external condom at the same time.  Never use petroleum jelly (such as Vaseline), grease, hand lotion, baby oil, or anything with oil in it. These products can make holes in the condom.  After intercourse, hold the edge of the condom as you remove it. This will help keep semen from spilling out of the condom.  Do not have sex with anyone who has symptoms of an STI, such as sores on the genitals or mouth.  Do not drink a lot of alcohol or use drugs before sex.  Limit your sex partners. Sex with one partner who has sex only with you can reduce your risk of getting an STI.  Don't share sex toys. But if you do share them, use a condom and clean the sex toys between each use.  Talk to any partners before you have sex. Talk about what you feel comfortable with and whether you have any boundaries with sex. And find out if your partner or partners may be at risk for any STI. Keep in mind that a person may be able to spread an STI even if they do not have symptoms. You and any partners may want to get tested for STIs.  Where can you learn more?  Go to https://www.Producteev.net/patiented  Enter B608 in the search box to learn more about \"Safer Sex: Care Instructions.\"  Current as of: November 27, 2023               Content Version: 14.0    7696-0389 [employer].   Care instructions adapted under license by your healthcare professional. If you have questions about a medical condition or this instruction, always ask your healthcare professional. [employer] disclaims any warranty or liability for your use of this information.      "

## 2024-04-03 LAB
C TRACH DNA SPEC QL PROBE+SIG AMP: NEGATIVE
N GONORRHOEA DNA SPEC QL NAA+PROBE: NEGATIVE

## 2024-04-04 LAB
BKR LAB AP GYN ADEQUACY: NORMAL
BKR LAB AP GYN INTERPRETATION: NORMAL
BKR LAB AP HPV REFLEX: NORMAL
BKR LAB AP LMP: NORMAL
BKR LAB AP PREVIOUS ABNORMAL: NORMAL
PATH REPORT.COMMENTS IMP SPEC: NORMAL
PATH REPORT.COMMENTS IMP SPEC: NORMAL
PATH REPORT.RELEVANT HX SPEC: NORMAL

## 2024-04-08 ENCOUNTER — PATIENT OUTREACH (OUTPATIENT)
Dept: FAMILY MEDICINE | Facility: CLINIC | Age: 44
End: 2024-04-08
Payer: COMMERCIAL

## 2024-04-08 PROBLEM — R87.610 ASCUS OF CERVIX WITH NEGATIVE HIGH RISK HPV: Status: ACTIVE | Noted: 2017-04-13

## 2024-04-08 LAB
HUMAN PAPILLOMA VIRUS 16 DNA: NEGATIVE
HUMAN PAPILLOMA VIRUS 18 DNA: NEGATIVE
HUMAN PAPILLOMA VIRUS FINAL DIAGNOSIS: ABNORMAL
HUMAN PAPILLOMA VIRUS OTHER HR: POSITIVE

## 2024-04-30 ENCOUNTER — TELEPHONE (OUTPATIENT)
Dept: NEUROLOGY | Facility: CLINIC | Age: 44
End: 2024-04-30

## 2024-04-30 NOTE — TELEPHONE ENCOUNTER
Reached out to patient to schedule New Seizure per referral received on 03/25/24. No answer, unable to leave VM as voicemail box full. Letter sent.

## 2024-05-15 PROBLEM — H81.399 EPISODIC PERIPHERAL VERTIGO: Status: RESOLVED | Noted: 2024-03-29 | Resolved: 2024-05-15

## 2024-05-15 NOTE — PROGRESS NOTES
PHYSICAL THERAPY DISCHARGE NOTE    Assessment:  Assessment: Patient was seen for initial evaluation and treatment for BPPV was provided on the same day. It is common for BPPV to be fully treated within one treatment session so patient was instructed to only f/u with therapy if symptoms continued or returned. It has now been over 30 days since pt was last seen and no follow up treatments were needed after treatment for BPPV.  This episode of care is now being discharged.       DISCHARGE  Reason for Discharge: Patient was instructed to follow up to therapy if symptoms continued but pt has not returned in 30 days     Equipment Issued: none    Discharge Plan: none    Referring Provider:  Yadiel Patel  PT, DPT   5/15/2024  [place] [address]  [email or web address]  Voicemail: [phone]

## 2024-08-13 ENCOUNTER — OFFICE VISIT (OUTPATIENT)
Dept: FAMILY MEDICINE | Facility: CLINIC | Age: 44
End: 2024-08-13
Payer: COMMERCIAL

## 2024-08-13 VITALS
DIASTOLIC BLOOD PRESSURE: 84 MMHG | TEMPERATURE: 99.3 F | HEIGHT: 70 IN | RESPIRATION RATE: 16 BRPM | HEART RATE: 83 BPM | SYSTOLIC BLOOD PRESSURE: 124 MMHG | WEIGHT: 236 LBS | BODY MASS INDEX: 33.79 KG/M2 | OXYGEN SATURATION: 99 %

## 2024-08-13 DIAGNOSIS — M17.10 ARTHRITIS OF KNEE: ICD-10-CM

## 2024-08-13 DIAGNOSIS — Z01.818 PREOP GENERAL PHYSICAL EXAM: Primary | ICD-10-CM

## 2024-08-13 LAB
ANION GAP SERPL CALCULATED.3IONS-SCNC: 10 MMOL/L (ref 7–15)
BUN SERPL-MCNC: 22 MG/DL (ref 6–20)
CALCIUM SERPL-MCNC: 9.1 MG/DL (ref 8.8–10.4)
CHLORIDE SERPL-SCNC: 100 MMOL/L (ref 98–107)
CREAT SERPL-MCNC: 0.67 MG/DL (ref 0.51–0.95)
EGFRCR SERPLBLD CKD-EPI 2021: >90 ML/MIN/1.73M2
ERYTHROCYTE [DISTWIDTH] IN BLOOD BY AUTOMATED COUNT: 13.2 % (ref 10–15)
FERRITIN SERPL-MCNC: 19 NG/ML (ref 6–175)
GLUCOSE SERPL-MCNC: 94 MG/DL (ref 70–99)
HCO3 SERPL-SCNC: 26 MMOL/L (ref 22–29)
HCT VFR BLD AUTO: 38.9 % (ref 35–47)
HGB BLD-MCNC: 12.8 G/DL (ref 11.7–15.7)
HOLD SPECIMEN: NORMAL
IRON BINDING CAPACITY (ROCHE): 351 UG/DL (ref 240–430)
IRON SATN MFR SERPL: 26 % (ref 15–46)
IRON SERPL-MCNC: 93 UG/DL (ref 37–145)
MCH RBC QN AUTO: 31.4 PG (ref 26.5–33)
MCHC RBC AUTO-ENTMCNC: 32.9 G/DL (ref 31.5–36.5)
MCV RBC AUTO: 96 FL (ref 78–100)
PLATELET # BLD AUTO: 257 10E3/UL (ref 150–450)
POTASSIUM SERPL-SCNC: 4.6 MMOL/L (ref 3.4–5.3)
RBC # BLD AUTO: 4.07 10E6/UL (ref 3.8–5.2)
SODIUM SERPL-SCNC: 136 MMOL/L (ref 135–145)
WBC # BLD AUTO: 5.4 10E3/UL (ref 4–11)

## 2024-08-13 PROCEDURE — 83550 IRON BINDING TEST: CPT | Performed by: NURSE PRACTITIONER

## 2024-08-13 PROCEDURE — 85027 COMPLETE CBC AUTOMATED: CPT | Performed by: NURSE PRACTITIONER

## 2024-08-13 PROCEDURE — 80048 BASIC METABOLIC PNL TOTAL CA: CPT | Performed by: NURSE PRACTITIONER

## 2024-08-13 PROCEDURE — 82728 ASSAY OF FERRITIN: CPT | Performed by: NURSE PRACTITIONER

## 2024-08-13 PROCEDURE — 36415 COLL VENOUS BLD VENIPUNCTURE: CPT | Performed by: NURSE PRACTITIONER

## 2024-08-13 PROCEDURE — 99214 OFFICE O/P EST MOD 30 MIN: CPT | Performed by: NURSE PRACTITIONER

## 2024-08-13 PROCEDURE — 83540 ASSAY OF IRON: CPT | Performed by: NURSE PRACTITIONER

## 2024-08-13 ASSESSMENT — PAIN SCALES - GENERAL: PAINLEVEL: MILD PAIN (2)
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
## 2024-08-13 NOTE — PATIENT INSTRUCTIONS

## 2024-08-29 ENCOUNTER — TRANSFERRED RECORDS (OUTPATIENT)
Dept: HEALTH INFORMATION MANAGEMENT | Facility: CLINIC | Age: 44
End: 2024-08-29
Payer: COMMERCIAL

## 2024-10-14 ENCOUNTER — OFFICE VISIT (OUTPATIENT)
Dept: FAMILY MEDICINE | Facility: CLINIC | Age: 44
End: 2024-10-14
Payer: COMMERCIAL

## 2024-10-14 VITALS
OXYGEN SATURATION: 99 % | TEMPERATURE: 97 F | DIASTOLIC BLOOD PRESSURE: 88 MMHG | HEART RATE: 79 BPM | WEIGHT: 238 LBS | SYSTOLIC BLOOD PRESSURE: 138 MMHG | HEIGHT: 70 IN | BODY MASS INDEX: 34.07 KG/M2 | RESPIRATION RATE: 16 BRPM

## 2024-10-14 DIAGNOSIS — F17.200 NICOTINE DEPENDENCE, UNCOMPLICATED, UNSPECIFIED NICOTINE PRODUCT TYPE: ICD-10-CM

## 2024-10-14 DIAGNOSIS — Z01.818 PREOP GENERAL PHYSICAL EXAM: Primary | ICD-10-CM

## 2024-10-14 DIAGNOSIS — M17.11 OSTEOARTHRITIS OF RIGHT KNEE, UNSPECIFIED OSTEOARTHRITIS TYPE: ICD-10-CM

## 2024-10-14 LAB
ANION GAP SERPL CALCULATED.3IONS-SCNC: 11 MMOL/L (ref 7–15)
BUN SERPL-MCNC: 10.5 MG/DL (ref 6–20)
CALCIUM SERPL-MCNC: 8.8 MG/DL (ref 8.8–10.4)
CHLORIDE SERPL-SCNC: 105 MMOL/L (ref 98–107)
CREAT SERPL-MCNC: 0.63 MG/DL (ref 0.51–0.95)
EGFRCR SERPLBLD CKD-EPI 2021: >90 ML/MIN/1.73M2
FERRITIN SERPL-MCNC: 32 NG/ML (ref 6–175)
GLUCOSE SERPL-MCNC: 104 MG/DL (ref 70–99)
HCO3 SERPL-SCNC: 22 MMOL/L (ref 22–29)
HGB BLD-MCNC: 12.8 G/DL (ref 11.7–15.7)
HOLD SPECIMEN: NORMAL
IRON BINDING CAPACITY (ROCHE): 328 UG/DL (ref 240–430)
IRON SATN MFR SERPL: 25 % (ref 15–46)
IRON SERPL-MCNC: 82 UG/DL (ref 37–145)
POTASSIUM SERPL-SCNC: 4.9 MMOL/L (ref 3.4–5.3)
SODIUM SERPL-SCNC: 138 MMOL/L (ref 135–145)

## 2024-10-14 PROCEDURE — 99214 OFFICE O/P EST MOD 30 MIN: CPT | Performed by: NURSE PRACTITIONER

## 2024-10-14 PROCEDURE — 36415 COLL VENOUS BLD VENIPUNCTURE: CPT | Performed by: NURSE PRACTITIONER

## 2024-10-14 PROCEDURE — 83540 ASSAY OF IRON: CPT | Performed by: NURSE PRACTITIONER

## 2024-10-14 PROCEDURE — 80048 BASIC METABOLIC PNL TOTAL CA: CPT | Performed by: NURSE PRACTITIONER

## 2024-10-14 PROCEDURE — 83550 IRON BINDING TEST: CPT | Performed by: NURSE PRACTITIONER

## 2024-10-14 PROCEDURE — 85018 HEMOGLOBIN: CPT | Performed by: NURSE PRACTITIONER

## 2024-10-14 PROCEDURE — 99406 BEHAV CHNG SMOKING 3-10 MIN: CPT | Performed by: NURSE PRACTITIONER

## 2024-10-14 PROCEDURE — 82728 ASSAY OF FERRITIN: CPT | Performed by: NURSE PRACTITIONER

## 2024-10-14 RX ORDER — NICOTINE 21 MG/24HR
1 PATCH, TRANSDERMAL 24 HOURS TRANSDERMAL EVERY 24 HOURS
Qty: 42 PATCH | Refills: 0 | Status: SHIPPED | OUTPATIENT
Start: 2024-10-14 | End: 2024-11-25

## 2024-10-14 RX ORDER — BUPROPION HYDROCHLORIDE 150 MG/1
TABLET, FILM COATED, EXTENDED RELEASE ORAL
Qty: 57 TABLET | Refills: 0 | Status: SHIPPED | OUTPATIENT
Start: 2024-10-14 | End: 2024-11-13

## 2024-10-14 RX ORDER — ASPIRIN 81 MG/1
81 TABLET, CHEWABLE ORAL DAILY
COMMUNITY
Start: 2024-08-30

## 2024-10-14 RX ORDER — NICOTINE 21 MG/24HR
1 PATCH, TRANSDERMAL 24 HOURS TRANSDERMAL EVERY 24 HOURS
Qty: 14 PATCH | Refills: 0 | Status: SHIPPED | OUTPATIENT
Start: 2024-11-25 | End: 2024-12-09

## 2024-10-14 RX ORDER — BUPROPION HYDROCHLORIDE 150 MG/1
150 TABLET, FILM COATED, EXTENDED RELEASE ORAL 2 TIMES DAILY
Qty: 60 TABLET | Refills: 2 | Status: SHIPPED | OUTPATIENT
Start: 2024-11-13

## 2024-10-14 ASSESSMENT — PAIN SCALES - GENERAL: PAINLEVEL: NO PAIN (0)

## 2024-10-14 NOTE — PATIENT INSTRUCTIONS
Nicotine Transdermal System   Habitrol, Nicoderm C-Q    Uses  For quitting smoking.    Instructions  DO NOT take this medicine by mouth.    Avoid placing the patch near the breast.    Remove the patch after 24 hours.    Keep the medicine at room temperature. Avoid heat and direct light.    This patch should not be cut.    Wash your hands before and after handling this medicine.    Remove old patch before applying new one. Change the location of the new patch.    If you have vivid dreams or trouble sleeping, you may remove the patch before going to sleep.    Ask your doctor or pharmacist about locations on your body where this patch can be used.    Remove the plastic liner that protects the sticky side of the patch before applying to the skin.    Be sure the area of skin is clean and dry before putting on a new patch.    Apply the patch to a clean, dry, hairless area.    Press the patch firmly for a few seconds to make sure it stays in place.    After removing the patch, fold it together and discard it out of reach of children and pets.    Please ask your doctor or pharmacist how you can safely dispose of used patches.    If the skin under the patch becomes irritated, remove the patch. Do not apply a new patch to the area until the skin feels better.    To avoid irritating your skin, use a different location for a new patch.    Apply the patch only to normal looking skin. Avoid areas of the skin that are red, have scrapes, or damaged.    If the patch falls off, apply a new a patch on a different location of the body.    Please tell your doctor and pharmacist about all the medicines you take. Include both prescription and over-the-counter medicines. Also tell them about any vitamins, herbal medicines, or anything else you take for your health.    If you need to stop this medicine, your doctor may wish to gradually reduce the dosage before stopping.    Do not use more than 1 patch at any one time.    Cautions  Tell  your doctor and pharmacist if you ever had an allergic reaction to a medicine. Symptoms of an allergic reaction can include trouble breathing, skin rash, itching, swelling, or severe dizziness.    Do not use the medication any more than instructed.    Avoid smoking while on this medicine. Smoking may increase your risk for stroke, heart attack, blood clots, high blood pressure, and other diseases of the heart and blood vessels.    Tell the doctor or pharmacist if you are pregnant, planning to be pregnant, or breastfeeding.    Ask your pharmacist if this medicine can interact with any of your other medicines. Be sure to tell them about all the medicines you take.    Please tell all your doctors and dentists that you are on this medicine before they provide care.    Side Effects  The following is a list of some common side effects from this medicine. Please speak with your doctor about what you should do if you experience these or other side effects.    skin irritation where medicine is applied    If you have any of the following side effects, you may be getting too much medicine. Please contact your doctor to let them know about these side effects.    diarrhea  dizziness  nausea  rapid heartbeat  vomiting    A few people may have an allergic reaction to this medicine. Symptoms can include difficulty breathing, skin rash, itching, swelling, or severe dizziness. If you notice any of these symptoms, seek medical help quickly.    Extra  Please speak with your doctor, nurse, or pharmacist if you have any questions about this medicine.      [email or web address]  IMPORTANT NOTE: This document tells you briefly how to take your medicine, but it does not tell you all there is to know about it. Your doctor or pharmacist may give you other documents about your medicine. Please talk to them if you have any questions. Always follow their advice. There is a more complete description of this medicine  available in English. Scan this code on your smartphone or tablet or use the web address below. You can also ask your pharmacist for a printout. If you have any questions, please ask your pharmacist.   2021 [employer].      5813-6636 [employer]. All rights reserved. This information is not intended as a substitute for professional medical care. Always follow your healthcare professional's instructions.    How to Take Your Medication Before Surgery  Preoperative Medication Instructions   Antiplatelet or Anticoagulation Medication Instructions   - aspirin: Discontinue aspirin 7-10 days prior to procedure to reduce bleeding risk. It should be resumed postoperatively.     Additional Medication Instructions   - Antiepileptics: Continue without modification.   - SSRIs, SNRIs, TCAs, Antipsychotics: Continue without modification.        Patient Education   Preparing for Your Surgery  For Adults  Getting started  In most cases, a nurse will call to review your health history and instructions. They will give you an arrival time based on your scheduled surgery time. Please be ready to share:  Your doctor's clinic name and phone number  Your medical, surgical, and anesthesia history  A list of allergies and sensitivities  A list of medicines, including herbal treatments and over-the-counter drugs  Whether the patient has a legal guardian (ask how to send us the papers in advance)  Note: You may not receive a call if you were seen at our PAC (Preoperative Assessment Center).  Please tell us if you're pregnant--or if there's any chance you might be pregnant. Some surgeries may injure a fetus (unborn baby), so they require a pregnancy test. Surgeries that are safe for a fetus don't always need a test, and you can choose whether to have one.   Preparing for surgery  Within 10 to 30 days of surgery: Have a pre-op exam (sometimes called an H&P, or History and Physical). This can be done at a clinic or  pre-operative center.  If you're having a , you may not need this exam. Talk to your care team.  At your pre-op exam, talk to your care team about all medicines you take. (This includes CBD oil and any drugs, such as THC, marijuana, and other forms of cannabis.) If you need to stop any medicine before surgery, ask when to start taking it again.  This is for your safety. Many medicines and drugs can make you bleed too much during surgery. Some change how well surgery (anesthesia) drugs work.  Call your insurance company to let them know you're having surgery. (If you don't have insurance, call [phone].)  Call your clinic if there's any change in your health. This includes a scrape or scratch near the surgery site, or any signs of a cold (sore throat, runny nose, cough, rash, fever).  Eating and drinking guidelines  For your safety: Unless your surgeon tells you otherwise, follow the guidelines below.  Eat and drink as normal until 8 hours before you arrive for surgery. After that, no food or milk. You can spit out gum when you arrive.  Drink clear liquids until 2 hours before you arrive. These are liquids you can see through, like water, Gatorade, and Propel Water. They also include plain black coffee and tea (no cream or milk).  No alcohol for 24 hours before you arrive. The night before surgery, stop any drinks that contain THC.  If your care team tells you to take medicine on the morning of surgery, it's okay to take it with a sip of water. No other medicines or drugs are allowed (including CBD oil)--follow your care team's instructions.  If you have questions the day of surgery, call your hospital or surgery center.   Preventing infection  Shower or bathe the night before and the morning of surgery. Follow the instructions your clinic gave you. (If no instructions, use regular soap.)  Don't shave or clip hair near your surgery site. We'll remove the hair if needed.  Don't smoke or vape the morning  of surgery. No chewing tobacco for 6 hours before you arrive. A nicotine patch is okay. You may spit out nicotine gum when you arrive.  For some surgeries, the surgeon will tell you to fully quit smoking and nicotine.  We will make every effort to keep you safe from infection. We will:  Clean our hands often with soap and water (or an alcohol-based hand rub).  Clean the skin at your surgery site with a special soap that kills germs.  Give you a special gown to keep you warm. (Cold raises the risk of infection.)  Wear hair covers, masks, gowns, and gloves during surgery.  Give antibiotic medicine, if prescribed. Not all surgeries need this medicine.  What to bring on the day of surgery  Photo ID and insurance card  Copy of your health care directive, if you have one  Glasses and hearing aids (bring cases)  You can't wear contacts during surgery  Inhaler and eye drops, if you use them (tell us about these when you arrive)  CPAP machine or breathing device, if you use them  A few personal items, if spending the night  If you have . . .  A pacemaker, ICD (cardiac defibrillator), or other implant: Bring the ID card.  An implanted stimulator: Bring the remote control.  A legal guardian: Bring a copy of the certified (court-stamped) guardianship papers.  Please remove any jewelry, including body piercings. Leave jewelry and other valuables at home.  If you're going home the day of surgery  You must have a responsible adult drive you home. They should stay with you overnight as well.  If you don't have someone to stay with you, and you aren't safe to go home alone, we may keep you overnight. Insurance often won't pay for this.  After surgery  If it's hard to control your pain or you need more pain medicine, please call your surgeon's office.  Questions?   If you have any questions for your care team, list them here:    ____________________________________________________________________________________________________________________________________________________________________________________________________________________________________________________________  For informational purposes only. Not to replace the advice of your health care provider. Copyright   2003, 2019 [facility]. All rights reserved. Clinically reviewed by Efren Gutierrez MD. [employer] [id number] - REV 08/24.
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
## 2025-01-25 DIAGNOSIS — F17.200 NICOTINE DEPENDENCE, UNCOMPLICATED, UNSPECIFIED NICOTINE PRODUCT TYPE: ICD-10-CM

## 2025-01-27 RX ORDER — BUPROPION HYDROCHLORIDE 150 MG/1
TABLET, FILM COATED, EXTENDED RELEASE ORAL
Qty: 60 TABLET | Refills: 0 | Status: SHIPPED | OUTPATIENT
Start: 2025-01-27

## 2025-03-12 ENCOUNTER — PATIENT OUTREACH (OUTPATIENT)
Dept: FAMILY MEDICINE | Facility: CLINIC | Age: 45
End: 2025-03-12
Payer: COMMERCIAL

## 2025-03-12 PROBLEM — R87.610 ASCUS OF CERVIX WITH NEGATIVE HIGH RISK HPV: Status: ACTIVE | Noted: 2017-04-13

## 2025-04-16 DIAGNOSIS — R56.9 CONVULSIONS, UNSPECIFIED CONVULSION TYPE (H): ICD-10-CM

## 2025-04-16 NOTE — TELEPHONE ENCOUNTER
Medication Question or Refill        What medication are you calling about (include dose and sig)?: Pending Prescriptions:                       Disp   Refills    carBAMazepine (TEGRETOL) 200 MG tablet    540 ta*3            Sig: Take 3 tablets (600 mg) by mouth 2 times daily.           LAST FILL        Preferred Pharmacy:     [id number] - [place], [place] - [address]  [address]  [facility] [address]  Phone: [phone] Fax: [phone]        Controlled Substance Agreement on file:   CSA -- Patient Level:    CSA: None found at the patient level.         Do you need a refill? Yes    Patient offered an appointment? Yes: appt scheduled with PCP 4/24    Do you have any questions or concerns?  Yes: pt states pharmacy never sent over refill request last week, pt now needs urgent fill      Could we send this information to you in [facility] or would you prefer to receive a phone call?:   Patient would prefer a phone call   Okay to leave a detailed message?: Yes at Home number on file [phone] (home)

## 2025-04-17 RX ORDER — CARBAMAZEPINE 200 MG/1
TABLET ORAL
Qty: 540 TABLET | Refills: 0 | OUTPATIENT
Start: 2025-04-17

## 2025-04-17 RX ORDER — CARBAMAZEPINE 200 MG/1
600 TABLET ORAL 2 TIMES DAILY
Qty: 540 TABLET | Refills: 0 | Status: SHIPPED | OUTPATIENT
Start: 2025-04-17

## 2025-05-05 ENCOUNTER — OFFICE VISIT (OUTPATIENT)
Dept: FAMILY MEDICINE | Facility: CLINIC | Age: 45
End: 2025-05-05
Payer: COMMERCIAL

## 2025-05-05 VITALS
HEIGHT: 70 IN | HEART RATE: 86 BPM | BODY MASS INDEX: 35.79 KG/M2 | WEIGHT: 250 LBS | TEMPERATURE: 98 F | SYSTOLIC BLOOD PRESSURE: 134 MMHG | OXYGEN SATURATION: 97 % | RESPIRATION RATE: 16 BRPM | DIASTOLIC BLOOD PRESSURE: 82 MMHG

## 2025-05-05 DIAGNOSIS — Z00.00 ROUTINE GENERAL MEDICAL EXAMINATION AT A HEALTH CARE FACILITY: Primary | ICD-10-CM

## 2025-05-05 DIAGNOSIS — Z12.31 VISIT FOR SCREENING MAMMOGRAM: ICD-10-CM

## 2025-05-05 DIAGNOSIS — Z13.0 SCREENING FOR DEFICIENCY ANEMIA: ICD-10-CM

## 2025-05-05 DIAGNOSIS — Z13.220 SCREENING FOR HYPERLIPIDEMIA: ICD-10-CM

## 2025-05-05 DIAGNOSIS — N89.8 VAGINAL ODOR: ICD-10-CM

## 2025-05-05 DIAGNOSIS — Z91.030 BEE STING ALLERGY: ICD-10-CM

## 2025-05-05 DIAGNOSIS — Z12.4 CERVICAL CANCER SCREENING: ICD-10-CM

## 2025-05-05 DIAGNOSIS — F17.200 NICOTINE DEPENDENCE, UNCOMPLICATED, UNSPECIFIED NICOTINE PRODUCT TYPE: ICD-10-CM

## 2025-05-05 DIAGNOSIS — R56.9 CONVULSIONS, UNSPECIFIED CONVULSION TYPE (H): ICD-10-CM

## 2025-05-05 LAB
ALBUMIN SERPL BCG-MCNC: 4.2 G/DL (ref 3.5–5.2)
ALP SERPL-CCNC: 70 U/L (ref 40–150)
ALT SERPL W P-5'-P-CCNC: 21 U/L (ref 0–50)
ANION GAP SERPL CALCULATED.3IONS-SCNC: 11 MMOL/L (ref 7–15)
AST SERPL W P-5'-P-CCNC: 25 U/L (ref 0–45)
BASOPHILS # BLD AUTO: 0 10E3/UL (ref 0–0.2)
BASOPHILS NFR BLD AUTO: 1 %
BILIRUB SERPL-MCNC: 0.4 MG/DL
BUN SERPL-MCNC: 16.3 MG/DL (ref 6–20)
CALCIUM SERPL-MCNC: 9.4 MG/DL (ref 8.8–10.4)
CHLORIDE SERPL-SCNC: 101 MMOL/L (ref 98–107)
CHOLEST SERPL-MCNC: 222 MG/DL
CREAT SERPL-MCNC: 0.86 MG/DL (ref 0.51–0.95)
EGFRCR SERPLBLD CKD-EPI 2021: 85 ML/MIN/1.73M2
EOSINOPHIL # BLD AUTO: 0.1 10E3/UL (ref 0–0.7)
EOSINOPHIL NFR BLD AUTO: 2 %
ERYTHROCYTE [DISTWIDTH] IN BLOOD BY AUTOMATED COUNT: 14.7 % (ref 10–15)
FASTING STATUS PATIENT QL REPORTED: NO
FASTING STATUS PATIENT QL REPORTED: NO
GLUCOSE SERPL-MCNC: 85 MG/DL (ref 70–99)
HCO3 SERPL-SCNC: 25 MMOL/L (ref 22–29)
HCT VFR BLD AUTO: 39.8 % (ref 35–47)
HDLC SERPL-MCNC: 65 MG/DL
HGB BLD-MCNC: 13.1 G/DL (ref 11.7–15.7)
IMM GRANULOCYTES # BLD: 0 10E3/UL
IMM GRANULOCYTES NFR BLD: 0 %
LDLC SERPL CALC-MCNC: 141 MG/DL
LYMPHOCYTES # BLD AUTO: 1.6 10E3/UL (ref 0.8–5.3)
LYMPHOCYTES NFR BLD AUTO: 26 %
MCH RBC QN AUTO: 31 PG (ref 26.5–33)
MCHC RBC AUTO-ENTMCNC: 32.9 G/DL (ref 31.5–36.5)
MCV RBC AUTO: 94 FL (ref 78–100)
MONOCYTES # BLD AUTO: 0.6 10E3/UL (ref 0–1.3)
MONOCYTES NFR BLD AUTO: 10 %
NEUTROPHILS # BLD AUTO: 3.9 10E3/UL (ref 1.6–8.3)
NEUTROPHILS NFR BLD AUTO: 62 %
NONHDLC SERPL-MCNC: 157 MG/DL
PLATELET # BLD AUTO: 245 10E3/UL (ref 150–450)
POTASSIUM SERPL-SCNC: 3.9 MMOL/L (ref 3.4–5.3)
PROT SERPL-MCNC: 6.6 G/DL (ref 6.4–8.3)
RBC # BLD AUTO: 4.23 10E6/UL (ref 3.8–5.2)
SODIUM SERPL-SCNC: 137 MMOL/L (ref 135–145)
TRIGL SERPL-MCNC: 79 MG/DL
WBC # BLD AUTO: 6.3 10E3/UL (ref 4–11)

## 2025-05-05 PROCEDURE — G2211 COMPLEX E/M VISIT ADD ON: HCPCS | Performed by: NURSE PRACTITIONER

## 2025-05-05 PROCEDURE — 1126F AMNT PAIN NOTED NONE PRSNT: CPT | Performed by: NURSE PRACTITIONER

## 2025-05-05 PROCEDURE — 99396 PREV VISIT EST AGE 40-64: CPT | Performed by: NURSE PRACTITIONER

## 2025-05-05 PROCEDURE — 80053 COMPREHEN METABOLIC PANEL: CPT | Performed by: NURSE PRACTITIONER

## 2025-05-05 PROCEDURE — 80061 LIPID PANEL: CPT | Performed by: NURSE PRACTITIONER

## 2025-05-05 PROCEDURE — 87624 HPV HI-RISK TYP POOLED RSLT: CPT | Performed by: NURSE PRACTITIONER

## 2025-05-05 PROCEDURE — 3079F DIAST BP 80-89 MM HG: CPT | Performed by: NURSE PRACTITIONER

## 2025-05-05 PROCEDURE — 3075F SYST BP GE 130 - 139MM HG: CPT | Performed by: NURSE PRACTITIONER

## 2025-05-05 PROCEDURE — 36415 COLL VENOUS BLD VENIPUNCTURE: CPT | Performed by: NURSE PRACTITIONER

## 2025-05-05 PROCEDURE — 85025 COMPLETE CBC W/AUTO DIFF WBC: CPT | Performed by: NURSE PRACTITIONER

## 2025-05-05 PROCEDURE — 80156 ASSAY CARBAMAZEPINE TOTAL: CPT | Performed by: NURSE PRACTITIONER

## 2025-05-05 PROCEDURE — 99214 OFFICE O/P EST MOD 30 MIN: CPT | Mod: 25 | Performed by: NURSE PRACTITIONER

## 2025-05-05 RX ORDER — BUPROPION HYDROCHLORIDE 150 MG/1
150 TABLET, FILM COATED, EXTENDED RELEASE ORAL 2 TIMES DAILY
Qty: 180 TABLET | Refills: 3 | Status: SHIPPED | OUTPATIENT
Start: 2025-05-05

## 2025-05-05 RX ORDER — CARBAMAZEPINE 200 MG/1
600 TABLET ORAL 2 TIMES DAILY
Qty: 540 TABLET | Refills: 3 | Status: SHIPPED | OUTPATIENT
Start: 2025-05-05

## 2025-05-05 RX ORDER — EPINEPHRINE 0.3 MG/.3ML
INJECTION SUBCUTANEOUS
Qty: 2 EACH | Refills: 1 | Status: SHIPPED | OUTPATIENT
Start: 2025-05-05

## 2025-05-05 SDOH — HEALTH STABILITY: PHYSICAL HEALTH: ON AVERAGE, HOW MANY DAYS PER WEEK DO YOU ENGAGE IN MODERATE TO STRENUOUS EXERCISE (LIKE A BRISK WALK)?: 7 DAYS

## 2025-05-05 ASSESSMENT — PAIN SCALES - GENERAL: PAINLEVEL_OUTOF10: NO PAIN (0)

## 2025-05-05 ASSESSMENT — SOCIAL DETERMINANTS OF HEALTH (SDOH): HOW OFTEN DO YOU GET TOGETHER WITH FRIENDS OR RELATIVES?: ONCE A WEEK

## 2025-05-05 NOTE — PROGRESS NOTES
Preventive Care Visit  [facility]  DIONNA Yousif CNP, Nurse Practitioner - Family  May 5, 2025      Assessment & Plan     Routine general medical examination at a health care facility  Healthy female exam completed today. Discussed lifestyle modifications including weight loss/ management, eating a balanced diet, and getting regular cardiovascular exercise. Discussed self breast exams and how to complete these. Pap Smear updated today. Labs updated today. Plan yearly annual physicals or sooner as needed.      Convulsions, unspecified convulsion type (H)  Doing well on antiseizure medications. Labs completed today. Medication refill.   - Carbamazepine total; Future  - Comprehensive metabolic panel (BMP + Alb, Alk Phos, ALT, AST, Total. Bili, TP); Future  - carBAMazepine (TEGRETOL) 200 MG tablet; Take 3 tablets (600 mg) by mouth 2 times daily. LAST FILL  - Carbamazepine total  - Comprehensive metabolic panel (BMP + Alb, Alk Phos, ALT, AST, Total. Bili, TP)    Bee sting allergy  Refill epipen today.   - EPINEPHrine (ANY BX GENERIC EQUIV) 0.3 MG/0.3ML injection 2-pack; Inject the contents of one device into the muscle as needed for anaphylaxis; may repeat one time in 5-15 minutes if response to initial dose is inadequate    Vaginal odor  Recommend checking wet prep when mensis is done.   - Wet prep - Clinic Collect; Future  - Wet prep - Clinic Collect    Cervical cancer screening  - HPV and Gynecologic Cytology Panel - Recommended Age 30 - 65 Years    Visit for screening mammogram  - MA Screening Bilateral w/ Tyler; Future      Nicotine dependence, uncomplicated, unspecified nicotine product type  Doing well with reducing medication.   - buPROPion (ZYBAN) 150 MG 12 hr tablet; Take 1 tablet (150 mg) by mouth 2 times daily.    Screening for hyperlipidemia  - Lipid panel reflex to direct LDL Non-fasting; Future  - Lipid panel reflex to direct LDL Non-fasting    Screening for deficiency  "anemia  - CBC with platelets and differential; Future  - CBC with platelets and differential    Patient has been advised of split billing requirements and indicates understanding: Yes        Nicotine/Tobacco Cessation  She reports that she has been smoking cigarettes. She started smoking about 10 years ago. She has a 5 pack-year smoking history. She has never used smokeless tobacco.  Nicotine/Tobacco Cessation Plan  Pharmacotherapies : bupropion (Zyban)      BMI  Estimated body mass index is 35.87 kg/m  as calculated from the following:    Height as of this encounter: 1.778 m (5' 10\").    Weight as of this encounter: 113.4 kg (250 lb).   Weight management plan: Discussed healthy diet and exercise guidelines    Counseling  Appropriate preventive services were addressed with this patient via screening, questionnaire, or discussion as appropriate for fall prevention, nutrition, physical activity, Tobacco-use cessation, social engagement, weight loss and cognition.  Checklist reviewing preventive services available has been given to the patient.  Reviewed patient's diet, addressing concerns and/or questions.   The patient was instructed to see the dentist every 6 months.   She is at risk for psychosocial distress and has been provided with information to reduce risk.     The longitudinal plan of care for the diagnosis(es)/condition(s) as documented were addressed during this visit. Due to the added complexity in care, I will continue to support Gunnar in the subsequent management and with ongoing continuity of care.    Follow-up   No follow-ups on file.     Follow-up Visit   Expected date:  May 05, 2026 (Approximate)      Follow Up Appointment Details:     Follow-up with whom?: PCP    Follow-Up for what?: Adult Preventive    How?: In Person                 Brenton Maher is a 44 year old, presenting for the following:  Physical        5/5/2025     4:13 PM   Additional Questions   Roomed by Geeta VALENTINE MA    "       HPI  *Possible chronic BV    Unsure about PAP-does have heavy period today     Would like to stay on Bupriopion-is on this for smoking but finds it helps very much with mental health-- working on smoking cessation. This has helped.     Weight concerns-works out almost everyday, counts macro-struggling with weight loss. She is down 300+lbs from when she started her weight loss journey.       Advance Care Planning    Discussed advance care planning with patient; however, patient declined at this time.        5/5/2025   General Health   How would you rate your overall physical health? Good   Feel stress (tense, anxious, or unable to sleep) To some extent   (!) STRESS CONCERN      5/5/2025   Nutrition   Three or more servings of calcium each day? Yes   Diet: Other   If other, please elaborate: i count macros,high protein,no sugar   How many servings of fruit and vegetables per day? (!) 2-3   How many sweetened beverages each day? 0-1         5/5/2025   Exercise   Days per week of moderate/strenous exercise 7 days         5/5/2025   Social Factors   Frequency of gathering with friends or relatives Once a week   Worry food won't last until get money to buy more No   Food not last or not have enough money for food? No   Do you have housing? (Housing is defined as stable permanent housing and does not include staying outside in a car, in a tent, in an abandoned building, in an overnight shelter, or couch-surfing.) Yes   Are you worried about losing your housing? No   Lack of transportation? No   Unable to get utilities (heat,electricity)? No         5/5/2025   Dental   Dentist two times every year? (!) NO         Today's PHQ-2 Score:       5/5/2025     4:14 PM   PHQ-2 ( 1999 [employer])   Q1: Little interest or pleasure in doing things 0   Q2: Feeling down, depressed or hopeless 0   PHQ-2 Score 0    Q1: Little interest or pleasure in doing things Not at all   Q2: Feeling down, depressed or hopeless Not at all   PHQ-2  Score 0       Patient-reported           5/5/2025   Substance Use   Alcohol more than 3/day or more than 7/wk No   Do you use any other substances recreationally? No    (!) CANNABIS PRODUCTS       Multiple values from one day are sorted in reverse-chronological order     Social History     Tobacco Use    Smoking status: Every Day     Current packs/day: 0.50     Average packs/day: 0.5 packs/day for 10.0 years (5.0 ttl pk-yrs)     Types: Cigarettes     Start date: 4/22/2015    Smokeless tobacco: Never    Tobacco comments:     3-4 ciggs per day   Vaping Use    Vaping status: Never Used   Substance Use Topics    Alcohol use: Not Currently     Comment: occ    Drug use: No          Mammogram Screening - Mammogram every 1-2 years updated in Health Maintenance based on mutual decision making        5/5/2025   STI Screening   New sexual partner(s) since last STI/HIV test? No     History of abnormal Pap smear: No - age 30-64 HPV with reflex Pap every 5 years recommended        Latest Ref Rng & Units 4/2/2024     9:13 AM 4/13/2017     2:48 PM 4/13/2017     2:40 PM   PAP / HPV   PAP  Negative for Intraepithelial Lesion or Malignancy (NILM)      PAP (Historical)   ASC-US     HPV 16 DNA Negative Negative   Negative    HPV 18 DNA Negative Negative   Negative    Other HR HPV Negative Positive   Negative      ASCVD Risk   The 10-year ASCVD risk score (Geoffrey HOWELL, et al., 2019) is: 3%    Values used to calculate the score:      Age: 44 years      Sex: Female      Is Non- : No      Diabetic: No      Tobacco smoker: Yes      Systolic Blood Pressure: 134 mmHg      Is BP treated: No      HDL Cholesterol: 82 mg/dL      Total Cholesterol: 303 mg/dL       Reviewed and updated as needed this visit by Provider                    Past Medical History:   Diagnosis Date    ASCUS of cervix with negative high risk HPV 04/13/2017    Convulsion (H)     Transient hypertension of pregnancy, antepartum 09/28/2006     Currently reponding well to bed rest. All labs negative    Urinary tract infection, site not specified      Past Surgical History:   Procedure Laterality Date    C/SECTION, CLASSICAL  2006    , Classical    C/SECTION, CLASSICAL      , Classical    TONSILLECTOMY & ADENOIDECTOMY  1991    TUBAL LIGATION  2006     OB History    Para Term  AB Living   3 3 3 0 0 3   SAB IAB Ectopic Multiple Live Births   0 0 0 0 3      # Outcome Date GA Lbr Aren/2nd Weight Sex Type Anes PTL Lv   3 Term 06 38w0d  4.111 kg (9 lb 1 oz) M CS .SPINAL  CRYS      Birth Comments:  - repeat. Vacuum      Name: Cosme      Apgar1: 3  Apgar5: 7   2 Term 03 40w0d 06:00 3.856 kg (8 lb 8 oz) F CS   CRYS      Birth Comments: pushed x 6 hours - -infant jaundice   1 Term 01 37w0d 03:00 2.58 kg (5 lb 11 oz) F    CRYS      Birth Comments: Preeclampsia     Lab work is in process  Labs reviewed in EPIC  BP Readings from Last 3 Encounters:   25 134/82   10/14/24 138/88   24 124/84    Wt Readings from Last 3 Encounters:   25 113.4 kg (250 lb)   10/14/24 108 kg (238 lb)   24 107 kg (236 lb)                  Patient Active Problem List   Diagnosis    Convulsions (H)    Generalized anxiety disorder    Obesity    CARDIOVASCULAR SCREENING; LDL GOAL LESS THAN 160    Shoulder instability    Tobacco abuse    Self-injurious behavior    ASCUS of cervix with negative high risk HPV    Arthritis of knee     Past Surgical History:   Procedure Laterality Date    C/SECTION, CLASSICAL  2006    , Classical    C/SECTION, CLASSICAL      , Classical    TONSILLECTOMY & ADENOIDECTOMY  1991    TUBAL LIGATION  2006       Social History     Tobacco Use    Smoking status: Every Day     Current packs/day: 0.50     Average packs/day: 0.5 packs/day for 10.0 years (5.0 ttl pk-yrs)     Types: Cigarettes     Start date: 2015     Smokeless tobacco: Never    Tobacco comments:     3-4 ciggs per day   Substance Use Topics    Alcohol use: Not Currently     Comment: occ     Family History   Problem Relation Age of Onset    Hypertension Father     Depression Father     Alcohol/Drug Father     Hypertension Paternal Grandmother     Depression Paternal Grandmother     Alcohol/Drug Paternal Grandmother     Psychotic Disorder Paternal Grandmother     Hypertension Paternal Grandfather     Depression Paternal Grandfather     Cancer Paternal Grandfather         cancer around his bile duct.    Alcohol/Drug Mother     Depression Mother     Scoliosis Mother     Alcohol/Drug Brother     Depression Brother     Psychotic Disorder Brother     Asthma No family hx of     C.A.D. No family hx of     Diabetes No family hx of     Cerebrovascular Disease No family hx of     Breast Cancer No family hx of     Cancer - colorectal No family hx of     Prostate Cancer No family hx of          Current Outpatient Medications   Medication Sig Dispense Refill    buPROPion (ZYBAN) 150 MG 12 hr tablet TAKE 1 TABLET BY MOUTH TWICE DAILY AFTER YOUR QUIT DATE TREATMENT GENERALLY CONTINUE 7-12 WEEKS TAKE YOUR AFTERNOON DOSE NO LATER THAN 4 PM 60 tablet 0    carBAMazepine (TEGRETOL) 200 MG tablet Take 3 tablets (600 mg) by mouth 2 times daily. LAST FILL 540 tablet 0    EQ ASPIRIN LOW DOSE 81 MG chewable tablet Take 81 mg by mouth daily. (Patient not taking: Reported on 5/5/2025)       Allergies   Allergen Reactions    Bee Venom Anaphylaxis    Shrimp Anaphylaxis    Nkda [No Known Drug Allergy]      Recent Labs   Lab Test 10/14/24  1030 08/13/24  1059 03/27/24  1704 10/14/22  1102 09/12/22  2331 07/23/21  1736 07/23/21  1736 07/23/20  1538 05/02/18  0857   LDL  --   --  202*  --   --   --   --   --  140*   HDL  --   --  82  --   --   --   --   --  52   TRIG  --   --  95  --   --   --   --   --  110   ALT  --   --  32  --  28  --  20 24  --    CR 0.63 0.67 0.59   < > 0.65   < > 0.76 0.69   "--    GFRESTIMATED >90 >90 >90   < > >90   < > >90 >90  --    GFRESTBLACK  --   --   --   --   --   --   --  >90  --    POTASSIUM 4.9 4.6 4.5   < > 3.6  --  3.8 3.9  --    TSH  --   --  0.88  --   --   --   --   --   --     < > = values in this interval not displayed.               Review of Systems  Constitutional, neuro, ENT, endocrine, pulmonary, cardiac, gastrointestinal, genitourinary, musculoskeletal, integument and psychiatric systems are negative, except as otherwise noted.     Objective    Exam  /82   Pulse 86   Temp 98  F (36.7  C) (Tympanic)   Resp 16   Ht 1.778 m (5' 10\")   Wt 113.4 kg (250 lb)   LMP 05/04/2025 (Exact Date)   SpO2 97%   BMI 35.87 kg/m     Estimated body mass index is 35.87 kg/m  as calculated from the following:    Height as of this encounter: 1.778 m (5' 10\").    Weight as of this encounter: 113.4 kg (250 lb).    Physical Exam  GENERAL: alert and no distress  EYES: Eyes grossly normal to inspection, PERRL and conjunctivae and sclerae normal  HENT: ear canals and TM's normal, nose and mouth without ulcers or lesions  NECK: no adenopathy, no asymmetry, masses, or scars  RESP: lungs clear to auscultation - no rales, rhonchi or wheezes  CV: regular rate and rhythm, normal S1 S2, no S3 or S4, no murmur, click or rub, no peripheral edema  ABDOMEN: soft, nontender, no hepatosplenomegaly, no masses and bowel sounds normal  MS: no gross musculoskeletal defects noted, no edema  SKIN: no suspicious lesions or rashes  NEURO: Normal strength and tone, mentation intact and speech normal  PSYCH: mentation appears normal, affect normal/bright        Signed Electronically by: DIONNA Yousif CNP    "

## 2025-05-05 NOTE — PATIENT INSTRUCTIONS
Patient Education   Preventive Care Advice   This is general advice given by our system to help you stay healthy. However, your care team may have specific advice just for you. Please talk to your care team about your preventive care needs.  Nutrition  Eat 5 or more servings of fruits and vegetables each day.  Try wheat bread, brown rice and whole grain pasta (instead of white bread, rice, and pasta).  Get enough calcium and vitamin D. Check the label on foods and aim for 100% of the RDA (recommended daily allowance).  Lifestyle  Exercise at least 150 minutes each week  (30 minutes a day, 5 days a week).  Do muscle strengthening activities 2 days a week. These help control your weight and prevent disease.  No smoking.  Wear sunscreen to prevent skin cancer.  Have a dental exam and cleaning every 6 months.  Yearly exams  See your health care team every year to talk about:  Any changes in your health.  Any medicines your care team has prescribed.  Preventive care, family planning, and ways to prevent chronic diseases.  Shots (vaccines)   HPV shots (up to age 26), if you've never had them before.  Hepatitis B shots (up to age 59), if you've never had them before.  COVID-19 shot: Get this shot when it's due.  Flu shot: Get a flu shot every year.  Tetanus shot: Get a tetanus shot every 10 years.  Pneumococcal, hepatitis A, and RSV shots: Ask your care team if you need these based on your risk.  Shingles shot (for age 50 and up)  General health tests  Diabetes screening:  Starting at age 35, Get screened for diabetes at least every 3 years.  If you are younger than age 35, ask your care team if you should be screened for diabetes.  Cholesterol test: At age 39, start having a cholesterol test every 5 years, or more often if advised.  Bone density scan (DEXA): At age 50, ask your care team if you should have this scan for osteoporosis (brittle bones).  Hepatitis C: Get tested at least once in your life.  STIs (sexually  transmitted infections)  Before age 24: Ask your care team if you should be screened for STIs.  After age 24: Get screened for STIs if you're at risk. You are at risk for STIs (including HIV) if:  You are sexually active with more than one person.  You don't use condoms every time.  You or a partner was diagnosed with a sexually transmitted infection.  If you are at risk for HIV, ask about PrEP medicine to prevent HIV.  Get tested for HIV at least once in your life, whether you are at risk for HIV or not.  Cancer screening tests  Cervical cancer screening: If you have a cervix, begin getting regular cervical cancer screening tests starting at age 21.  Breast cancer scan (mammogram): If you've ever had breasts, begin having regular mammograms starting at age 40. This is a scan to check for breast cancer.  Colon cancer screening: It is important to start screening for colon cancer at age 45.  Have a colonoscopy test every 10 years (or more often if you're at risk) Or, ask your provider about stool tests like a FIT test every year or Cologuard test every 3 years.  To learn more about your testing options, visit:   .  For help making a decision, visit:   https://bit.ly/bh01944.  Prostate cancer screening test: If you have a prostate, ask your care team if a prostate cancer screening test (PSA) at age 55 is right for you.  Lung cancer screening: If you are a current or former smoker ages 50 to 80, ask your care team if ongoing lung cancer screenings are right for you.  For informational purposes only. Not to replace the advice of your health care provider. Copyright   2023 [facility]. All rights reserved. Clinically reviewed by the [facility] Transitions Program. [place] [id number] - REV 01/24.  Learning About Stress  What is stress?     Stress is your body's response to a hard situation. Your body can have a physical, emotional, or mental response. Stress is a fact of life for most people, and it  affects everyone differently. What causes stress for you may not be stressful for someone else.  A lot of things can cause stress. You may feel stress when you go on a job interview, take a test, or run a race. This kind of short-term stress is normal and even useful. It can help you if you need to work hard or react quickly. For example, stress can help you finish an important job on time.  Long-term stress is caused by ongoing stressful situations or events. Examples of long-term stress include long-term health problems, ongoing problems at work, or conflicts in your family. Long-term stress can harm your health.  How does stress affect your health?  When you are stressed, your body responds as though you are in danger. It makes hormones that speed up your heart, make you breathe faster, and give you a burst of energy. This is called the fight-or-flight stress response. If the stress is over quickly, your body goes back to normal and no harm is done.  But if stress happens too often or lasts too long, it can have bad effects. Long-term stress can make you more likely to get sick, and it can make symptoms of some diseases worse. If you tense up when you are stressed, you may develop neck, shoulder, or low back pain. Stress is linked to high blood pressure and heart disease.  Stress also harms your emotional health. It can make you lee, tense, or depressed. Your relationships may suffer, and you may not do well at work or school.  What can you do to manage stress?  You can try these things to help manage stress:   Do something active. Exercise or activity can help reduce stress. Walking is a great way to get started. Even everyday activities such as housecleaning or yard work can help.  Try yoga or halley chi. These techniques combine exercise and meditation. You may need some training at first to learn them.  Do something you enjoy. For example, listen to music or go to a movie. Practice your hobby or do volunteer  "work.  Meditate. This can help you relax, because you are not worrying about what happened before or what may happen in the future.  Do guided imagery. Imagine yourself in any setting that helps you feel calm. You can use online videos, books, or a teacher to guide you.  Do breathing exercises. For example:  From a standing position, bend forward from the waist with your knees slightly bent. Let your arms dangle close to the floor.  Breathe in slowly and deeply as you return to a standing position. Roll up slowly and lift your head last.  Hold your breath for just a few seconds in the standing position.  Breathe out slowly and bend forward from the waist.  Let your feelings out. Talk, laugh, cry, and express anger when you need to. Talking with supportive friends or family, a counselor, or a sae leader about your feelings is a healthy way to relieve stress. Avoid discussing your feelings with people who make you feel worse.  Write. It may help to write about things that are bothering you. This helps you find out how much stress you feel and what is causing it. When you know this, you can find better ways to cope.  What can you do to prevent stress?  You might try some of these things to help prevent stress:  Manage your time. This helps you find time to do the things you want and need to do.  Get enough sleep. Your body recovers from the stresses of the day while you are sleeping.  Get support. Your family, friends, and community can make a difference in how you experience stress.  Limit your news feed. Avoid or limit time on social media or news that may make you feel stressed.  Do something active. Exercise or activity can help reduce stress. Walking is a great way to get started.  Where can you learn more?  Go to https://www.[employer].net/patiented  Enter N032 in the search box to learn more about \"Learning About Stress.\"  Current as of: October 24, 2024  Content Version: 14.4 2024-2025 [employer]   Care instructions adapted under license by your healthcare professional. If you have questions about a medical condition or this instruction, always ask your healthcare professional. [employer] disclaims any warranty or liability for your use of this information.    Substance Use Disorder: Care Instructions  Overview     You can improve your life and health by stopping your use of alcohol or drugs. When you don't drink or use drugs, you may feel and sleep better. You may get along better with your family, friends, and coworkers. There are medicines and programs that can help with substance use disorder.  How can you care for yourself at home?  Here are some ways to help you stay sober and prevent relapse.  If you have been given medicine to help keep you sober or reduce your cravings, be sure to take it exactly as prescribed.  Talk to your doctor about programs that can help you stop using drugs or drinking alcohol.  Do not keep alcohol or drugs in your home.  Plan ahead. Think about what you'll say if other people ask you to drink or use drugs. Try not to spend time with people who drink or use drugs.  Use the time and money spent on drinking or drugs to do something that's important to you.  Preventing a relapse  Have a plan to deal with relapse. Learn to recognize changes in your thinking that lead you to drink or use drugs. Get help before you start to drink or use drugs again.  Try to stay away from situations, friends, or places that may lead you to drink or use drugs.  If you feel the need to drink alcohol or use drugs again, seek help right away. Call a trusted friend or family member. Some people get support from organizations such as Narcotics Anonymous or [employer] or from treatment facilities.  If you relapse, get help as soon as you can. Some people make a plan with another person that outlines what they want that person to do for them if they relapse. The plan usually includes  how to handle the relapse and who to notify in case of relapse.  Don't give up. Remember that a relapse doesn't mean that you have failed. Use the experience to learn the triggers that lead you to drink or use drugs. Then quit again. Recovery is a lifelong process. Many people have several relapses before they are able to quit for good.  Follow-up care is a key part of your treatment and safety. Be sure to make and go to all appointments, and call your doctor if you are having problems. It's also a good idea to know your test results and keep a list of the medicines you take.  When should you call for help?   Call 911  anytime you think you may need emergency care. For example, call if you or someone else:    Has overdosed or has withdrawal signs. Be sure to tell the emergency workers that you are or someone else is using or trying to quit using drugs. Overdose or withdrawal signs may include:  Losing consciousness.  Seizure.  Seeing or hearing things that aren't there (hallucinations).     Is thinking or talking about suicide or harming others.   Where to get help 24 hours a day, 7 days a week   If you or someone you know talks about suicide, self-harm, a mental health crisis, a substance use crisis, or any other kind of emotional distress, get help right away. You can:    Call the Suicide and Crisis Lifeline at 706.     Call 6-381-366-TALK (1-355.645.2643).     Text HOME to 516607 to access the Crisis Text Line.   Consider saving these numbers in your phone.  Go to [employer]org for more information or to chat online.  Call your doctor now or seek immediate medical care if:    You are having withdrawal symptoms. These may include nausea or vomiting, sweating, shakiness, and anxiety.   Watch closely for changes in your health, and be sure to contact your doctor if:    You have a relapse.     You need more help or support to stop.   Where can you learn more?  Go to https://www.healthwise.net/patiented  Enter H573  "in the search box to learn more about \"Substance Use Disorder: Care Instructions.\"  Current as of: August 20, 2024  Content Version: 14.4 2024-2025 [employer].   Care instructions adapted under license by your healthcare professional. If you have questions about a medical condition or this instruction, always ask your healthcare professional. [employer] disclaims any warranty or liability for your use of this information.       "

## 2025-05-06 ENCOUNTER — PATIENT OUTREACH (OUTPATIENT)
Dept: CARE COORDINATION | Facility: CLINIC | Age: 45
End: 2025-05-06
Payer: COMMERCIAL

## 2025-05-06 LAB
CARBAMAZEPINE SERPL-MCNC: 6.3 UG/ML (ref 4–12)
HPV HR 12 DNA CVX QL NAA+PROBE: NEGATIVE
HPV16 DNA CVX QL NAA+PROBE: NEGATIVE
HPV18 DNA CVX QL NAA+PROBE: NEGATIVE
HUMAN PAPILLOMA VIRUS FINAL DIAGNOSIS: NORMAL

## 2025-05-07 ENCOUNTER — RESULTS FOLLOW-UP (OUTPATIENT)
Dept: OBGYN | Facility: CLINIC | Age: 45
End: 2025-05-07

## 2025-05-07 DIAGNOSIS — R87.610 ASCUS OF CERVIX WITH NEGATIVE HIGH RISK HPV: Primary | ICD-10-CM

## 2025-05-12 ENCOUNTER — PATIENT OUTREACH (OUTPATIENT)
Dept: FAMILY MEDICINE | Facility: CLINIC | Age: 45
End: 2025-05-12
Payer: COMMERCIAL

## 2025-05-12 NOTE — RESULT ENCOUNTER NOTE
Cc'd to provider as FYI only due to hx.  No response needed unless prefer a change in plan.    Normal pap/Neg HPV letter sent through [employer] results. Next pap smear and HPV due in 1 year.     Licha Coreas, RN, BSN  Pap Tracking Nurse

## 2025-06-19 ENCOUNTER — HOSPITAL ENCOUNTER (INPATIENT)
Facility: CLINIC | Age: 45
Setting detail: SURGERY ADMIT
End: 2025-06-19
Attending: ORTHOPAEDIC SURGERY | Admitting: ORTHOPAEDIC SURGERY

## 2025-07-14 ENCOUNTER — TELEPHONE (OUTPATIENT)
Dept: FAMILY MEDICINE | Facility: CLINIC | Age: 45
End: 2025-07-14

## 2025-07-14 NOTE — TELEPHONE ENCOUNTER
Looks like patient has order on file for Carbamazepine from 5-5-25 office visit. I do not see she needs any labs as per 5-5-25 dictation,:      [place],  Your labs overall look great. Your cholesterol is much better than a year ago. Your carbamazepine level is stable. Kidney and liver function is stable.  DIONNA Yousif CNP   Written by DIONNA Rogers CNP on 5/6/2025  7:09 AM CDT    Spoke to patient and advised she has refills on file at the pharmacy and her labs were okay per dictation, she thought the order had been sent to a different pharmacy.     Carolyn Aggarwal RN

## 2025-07-14 NOTE — TELEPHONE ENCOUNTER
General Call      Reason for Call:  Medication Refill    What are your questions or concerns:  Pt calling - looking to get a refill of her carBAMazepine (TEGRETOL) 200 MG tablet medication. This was discontinued on 5/5. Pt stated she thinks she was supposed to come in for labs, but has been out of town for 2 months for a family emergency, and is going back out of town on Wednesday. Wondering if Kati Read is able to send in a refill. Stated she has about 4-5 days left of meds right now, but hoping to  before she leaves on Wednesday.    Could we send this information to you in [place] or would you prefer to receive a phone call?:   Patient would prefer a phone call   Okay to leave a detailed message?: Yes at Cell number on file:    Telephone Information:   Mobile [phone]     Asmita Grant Patient

## 2025-09-04 ENCOUNTER — HOSPITAL ENCOUNTER (EMERGENCY)
Facility: CLINIC | Age: 45
Discharge: HOME | End: 2025-09-04
Attending: FAMILY MEDICINE

## 2025-09-04 VITALS
TEMPERATURE: 98.4 F | SYSTOLIC BLOOD PRESSURE: 145 MMHG | HEART RATE: 73 BPM | DIASTOLIC BLOOD PRESSURE: 104 MMHG | RESPIRATION RATE: 16 BRPM | OXYGEN SATURATION: 99 %

## 2025-09-04 DIAGNOSIS — Z76.0 ENCOUNTER FOR MEDICATION REFILL: Primary | ICD-10-CM

## 2025-09-04 PROCEDURE — 250N000013 HC RX MED GY IP 250 OP 250 PS 637: Performed by: FAMILY MEDICINE

## 2025-09-04 RX ORDER — CARBAMAZEPINE 200 MG/1
600 TABLET ORAL ONCE
Status: COMPLETED | OUTPATIENT
Start: 2025-09-04 | End: 2025-09-04

## 2025-09-04 RX ADMIN — CARBAMAZEPINE 600 MG: 200 TABLET ORAL at 21:53

## 2025-09-04 ASSESSMENT — ACTIVITIES OF DAILY LIVING (ADL): ADLS_ACUITY_SCORE: 41

## 2025-09-04 ASSESSMENT — COLUMBIA-SUICIDE SEVERITY RATING SCALE - C-SSRS
2. HAVE YOU ACTUALLY HAD ANY THOUGHTS OF KILLING YOURSELF IN THE PAST MONTH?: NO
1. IN THE PAST MONTH, HAVE YOU WISHED YOU WERE DEAD OR WISHED YOU COULD GO TO SLEEP AND NOT WAKE UP?: NO
6. HAVE YOU EVER DONE ANYTHING, STARTED TO DO ANYTHING, OR PREPARED TO DO ANYTHING TO END YOUR LIFE?: NO